# Patient Record
Sex: MALE | Race: WHITE | URBAN - METROPOLITAN AREA
[De-identification: names, ages, dates, MRNs, and addresses within clinical notes are randomized per-mention and may not be internally consistent; named-entity substitution may affect disease eponyms.]

---

## 2018-06-20 ENCOUNTER — OFFICE VISIT (OUTPATIENT)
Dept: PEDIATRICS | Facility: CLINIC | Age: 28
End: 2018-06-20
Payer: COMMERCIAL

## 2018-06-20 VITALS
SYSTOLIC BLOOD PRESSURE: 142 MMHG | TEMPERATURE: 99.8 F | BODY MASS INDEX: 26.59 KG/M2 | WEIGHT: 189.9 LBS | HEIGHT: 71 IN | OXYGEN SATURATION: 96 % | DIASTOLIC BLOOD PRESSURE: 86 MMHG | HEART RATE: 141 BPM

## 2018-06-20 DIAGNOSIS — H65.01 RIGHT ACUTE SEROUS OTITIS MEDIA, RECURRENCE NOT SPECIFIED: Primary | ICD-10-CM

## 2018-06-20 PROCEDURE — 99203 OFFICE O/P NEW LOW 30 MIN: CPT | Performed by: PHYSICIAN ASSISTANT

## 2018-06-20 NOTE — MR AVS SNAPSHOT
"              After Visit Summary   2018    Cody Muñiz    MRN: [id number]           Patient Information     Date Of Birth          1990        Visit Information        Provider Department      2018 12:50 PM Jazmín Hamilton PA-C [facility] [place]        Today's Diagnoses     Right acute serous otitis media, recurrence not specified    -  1      Care Instructions    Begin antibiotics  Take with food            Follow-ups after your visit        Who to contact     If you have questions or need follow up information about today's clinic visit or your schedule please contact [facility] directly at [phone].  Normal or non-critical lab and imaging results will be communicated to you by "[employer]hart, letter or phone within 4 business days after the clinic has received the results. If you do not hear from us within 7 days, please contact the clinic through "[employer]hart or phone. If you have a critical or abnormal lab result, we will notify you by phone as soon as possible.  Submit refill requests through [employer] or call your pharmacy and they will forward the refill request to us. Please allow 3 business days for your refill to be completed.          Additional Information About Your Visit        MyChart Information     [employer] lets you send messages to your doctor, view your test results, renew your prescriptions, schedule appointments and more. To sign up, go to [email or web address] . Click on \"Log in\" on the left side of the screen, which will take you to the Welcome page. Then click on \"Sign up Now\" on the right side of the page.     You will be asked to enter the access code listed below, as well as some personal information. Please follow the directions to create your username and password.     Your access code is: [email or web address]  Expires: 2018  1:10 PM     Your access code will  in 90 days. If you need help or a new code, please call your [facility] or " "[phone].        Care EveryWhere ID     This is your Care EveryWhere ID. This could be used by other organizations to access your [facility] medical records  [id number]        Your Vitals Were     Pulse Temperature Height Pulse Oximetry BMI (Body Mass Index)       141 99.8  F (37.7  C) (Oral) 5' 11\" (1.803 m) 96% 26.49 kg/m2        Blood Pressure from Last 3 Encounters:   06/20/18 142/86   07/31/15 124/86   07/28/15 132/86    Weight from Last 3 Encounters:   06/20/18 189 lb 14.4 oz (86.1 kg)   07/31/15 198 lb 14.4 oz (90.2 kg)   02/21/14 203 lb 12.8 oz (92.4 kg)              Today, you had the following     No orders found for display         Today's Medication Changes          These changes are accurate as of 6/20/18  1:10 PM.  If you have any questions, ask your nurse or doctor.               Start taking these medicines.        Dose/Directions    amoxicillin-clavulanate 875-125 MG per tablet   Commonly known as:  AUGMENTIN   Used for:  Right acute serous otitis media, recurrence not specified   Started by:  Jazmín Hamilton PA-C        Dose:  1 tablet   Take 1 tablet by mouth 2 times daily   Quantity:  20 tablet   Refills:  0            Where to get your medicines      These medications were sent to [id number] - [place], [place] - [address]  AT [address] [place] [address]     Phone:  [phone]     amoxicillin-clavulanate 875-125 MG per tablet                Primary Care Provider Fax #    Physician No Ref-Primary [phone]       No address on file        Equal Access to Services     GABBY NUNEZ AH: Christal Payne, hayley mason, qaybta kaalmada toan, megan nuñez. So [facility] [phone].    ATENCIÓN: Si habla español, tiene a rivera disposición servicios gratuitos de asistencia lingüística. Llame al [phone].    We comply with applicable federal civil rights laws and [place] laws. We " do not discriminate on the basis of race, color, national origin, age, disability, sex, sexual orientation, or gender identity.            Thank you!     Thank you for choosing [facility] [place]  for your care. Our goal is always to provide you with excellent care. Hearing back from our patients is one way we can continue to improve our services. Please take a few minutes to complete the written survey that you may receive in the mail after your visit with us. Thank you!             Your Updated Medication List - Protect others around you: Learn how to safely use, store and throw away your medicines at www.disposemymeds.org.          This list is accurate as of 6/20/18  1:10 PM.  Always use your most recent med list.                   Brand Name Dispense Instructions for use Diagnosis    amoxicillin-clavulanate 875-125 MG per tablet    AUGMENTIN    20 tablet    Take 1 tablet by mouth 2 times daily    Right acute serous otitis media, recurrence not specified

## 2018-06-20 NOTE — PROGRESS NOTES
"  SUBJECTIVE:   Cody Muñiz is a 27 year old male who presents to clinic today for the following health issues:    Acute Illness   Acute illness concerns: cough  Onset: 1 week    Fever: no    Chills/Sweats: YES- sweats    Headache (location?): YES    Sinus Pressure:no    Conjunctivitis:  no    Ear Pain: no    Rhinorrhea: YES- clear    Congestion: YES- chest    Sore Throat: YES- slight     Cough: YES-barking    Wheeze: no    Decreased Appetite: YES    Nausea: no    Vomiting: no    Diarrhea:  no    Dysuria/Freq.: no    Fatigue/Achiness: YES- fatigue    Sick/Strep Exposure: YES- wprk     Therapies Tried and outcome: none  Nonsmoker.   Work:factory      ROS:  ROS otherwise negative    OBJECTIVE:                                                    /86 (BP Location: Right arm, Cuff Size: Adult Large)  Pulse 141  Temp 99.8  F (37.7  C) (Oral)  Ht 5' 11\" (1.803 m)  Wt 189 lb 14.4 oz (86.1 kg)  SpO2 96%  BMI 26.49 kg/m2  Body mass index is 26.49 kg/(m^2).   GENERAL: alert, no distress  HENT: ear canals- normal; TMs- erythemic on right; Nose- normal; Mouth- erythemic posterior pharynx and uvula  NECK: tonsillar LAD  RESP: lungs clear to auscultation - no rales, no rhonchi, no wheezes  CV: regular rates and rhythm, normal S1 S2, no S3 or S4 and no murmur, no click or rub    Diagnostic test results:  No results found for this or any previous visit (from the past 24 hour(s)).     ASSESSMENT/PLAN:                                                    (H65.01) Right acute serous otitis media, recurrence not specified  (primary encounter diagnosis)  Comment: begin antibiotics.   Plan: amoxicillin-clavulanate (AUGMENTIN) 875-125 MG         per tablet          See Patient Instructions    Jazmín Hamilton PA-C  [facility] [place]  "

## 2018-06-20 NOTE — LETTER
June 20, 2018      Cody Muñiz  [address]   [place] [address]        To Whom It May Concern:    Cody Muñiz  was seen on 6/20/18.  Please excuse him from 6/19-6/20 due to illness.        Sincerely,        Jazmín Hamilton PA-C

## 2018-06-26 ENCOUNTER — OFFICE VISIT (OUTPATIENT)
Dept: PEDIATRICS | Facility: CLINIC | Age: 28
End: 2018-06-26
Payer: COMMERCIAL

## 2018-06-26 VITALS
HEIGHT: 71 IN | SYSTOLIC BLOOD PRESSURE: 134 MMHG | TEMPERATURE: 98.4 F | HEART RATE: 119 BPM | OXYGEN SATURATION: 95 % | BODY MASS INDEX: 26.32 KG/M2 | DIASTOLIC BLOOD PRESSURE: 106 MMHG | WEIGHT: 188 LBS

## 2018-06-26 DIAGNOSIS — M54.50 ACUTE RIGHT-SIDED LOW BACK PAIN WITHOUT SCIATICA: Primary | ICD-10-CM

## 2018-06-26 PROCEDURE — 99214 OFFICE O/P EST MOD 30 MIN: CPT | Performed by: INTERNAL MEDICINE

## 2018-06-26 NOTE — MR AVS SNAPSHOT
After Visit Summary   6/26/2018    Cody Muñiz    MRN: [id number]           Patient Information     Date Of Birth          1990        Visit Information        Provider Department      6/26/2018 2:20 PM Alexis Garrison MD [facility]        Today's Diagnoses     Acute right-sided low back pain without sciatica    -  1      Care Instructions    Call physical therapy for an appointment.               Low Back Pain            What is low back pain?   Low back pain is pain and stiffness in the lower back. It is one of the most common reasons people miss work.   How does it occur?   Your lower back is called your lumbar spine. It is made up of 5 bones called lumbar vertebrae. In between the vertebrae are shock absorbers called disks. Back pain can occur from an injury to the vertebrae or when a disk bulges or herniates.   Low back pain is usually caused when a ligament or muscle holding a vertebra in its proper position is strained. Vertebrae are bones that make up the spinal column through which the spinal cord passes. When these muscles or ligaments become weak or strained, the spine loses its stability, resulting in pain.   Low back pain can occur if your job involves lifting and carrying heavy objects, or if you spend a lot of time sitting or standing in one position or bending over. It can be caused by a fall or by unusually strenuous exercise. It can be brought on by the tension and stress that cause headaches in some people. It can even be brought on by violent sneezing or coughing.   People who are overweight may have low back pain because of the added stress on their back.   Back pain may occur when the muscles, joints, bones, and connective tissues of the back become inflamed as a result of an infection or an immune system problem. Arthritic disorders as well as some congenital and degenerative conditions may cause back pain.   Back pain accompanied by loss of bladder or bowel  control, trouble moving your legs, or numbness or tingling in your arms or legs requires immediate medical treatment.   What are the symptoms?   Symptoms include:   pain in the back or legs   stiffness, spasm, or limited motion   The pain may be constant or may happen only in certain positions. It may get worse when you cough, sneeze, bend, twist, or strain during a bowel movement. The pain may be in only one spot or may spread to other areas, most commonly down the buttocks and into the back of the thigh.   A low back strain typically does not produce pain past the knee into the calf or foot. Tingling or numbness in the calf or foot may indicate a herniated disk or pinched nerve.   Be sure to see your healthcare provider if:   You have weakness in your leg, especially if you cannot lift your foot, because this may be a sign of nerve damage.   You have new bowel or bladder problems as well as back pain, which may be a sign of severe injury to your spinal cord.   You have pain that gets worse despite treatment.   How is it diagnosed?   Your healthcare provider will review your medical history and examine you. You may have X-rays, an MRI, CT scan, or a bone scan.   How is it treated?   To treat this condition:   Put an ice pack, gel pack, or package of frozen vegetables, wrapped in a cloth on the area every 3 to 4 hours, for up to 20 minutes at a time for the first 2 or 3 days.   Use a heating pad or hot water bottle. Don't let the heating pad get too hot, and don't fall asleep with it. You could get a burn.   Rest in bed on a firm mattress. Often it helps to lie on your back with your knees raised on a pillow. However, some people prefer to lie on their side with their knees bent. It's best to try to stay active, so try not to rest in bed longer than 1 to 2 days.   Take muscle relaxants as recommended by your healthcare provider.   Take an anti-inflammatory such as ibuprofen, or other medicine as directed by your  provider. Nonsteroidal anti-inflammatory medicines (NSAIDs) may cause stomach bleeding and other problems. These risks increase with age. Read the label and take as directed. Unless recommended by your healthcare provider, do not take for more than 10 days.   Get a back massage by a trained person.   Wear a belt or corset to support your back.   Do the exercises recommended by your provider. Your provider may also prescribe physical therapy.   Talk with a counselor, if your back pain is related to tension caused by emotional problems.   When the pain is gone, ask your healthcare provider about starting an exercise program such as the following:   Exercise moderately every day, using stretching and warm-up exercises suggested by your provider or physical therapist.   Exercise vigorously for about 30 minutes 3 times a week by walking, swimming, using a stationary bicycle, or doing low-impact aerobics.   Exercising regularly will not only help your back, it will also help keep you healthier overall.   How long will the effects last?   The effects of back pain last as long as the cause exists or until your body recovers from the strain, usually a day or two but sometimes weeks.   How can I take care of myself?   In addition to the treatment described above, keep in mind these suggestions:   Practice good posture. Stand with your head up, shoulders straight, chest forward, weight balanced evenly on both feet, and pelvis tucked in.   Lose weight if you are overweight   Keep your core muscles strong. These are your abdominal and back muscles.   Sleep without a pillow under your head.   Pain is the best way to  the pace you should set in increasing your activity and exercise. Minor discomfort, stiffness, soreness, and mild aches need not interfere with activity. However, limit your activities temporarily if:   Your symptoms return.   The pain increases when you are more active.   The pain increases within 24 hours  after a new or higher level of activity.   When can I return to my normal activities?   Everyone recovers from an injury at a different rate. Return to your activities depends on how soon your back recovers, not by how many days or weeks it has been since your injury has occurred. In general, the longer you have symptoms before you start treatment, the longer it will take to get better. The goal is to return to your normal activities as soon as is safely possible. If you return too soon you may worsen your injury.   It is important that you have fully recovered from your low back pain before you return to any strenuous activity. You must be able to have the same range of motion that you had before your injury. You must be able to walk and twist without pain.   What can I do to help prevent low back pain?   You can reduce the strain on your back by doing the following:   Don't push with your arms when you move a heavy object. Turn around and push backwards so the strain is taken by your legs.   Whenever you sit, sit in a straight-backed chair and hold your spine against the back of the chair.   Bend your knees and hips and keep your back straight when you lift a heavy object.   Avoid lifting heavy objects higher than your waist.   Hold packages you carry close to your body, with your arms bent.   Use a footrest for one foot when you stand or sit in one spot for a long time. This keeps your back straight.   Bend your knees when you bend over.   Sit close to the pedals when you drive and use your seat belt and a hard backrest or pillow.   Lie on your side with your knees bent when you sleep or rest. It may help to put a pillow between your knees.   Put a pillow under your knees when you sleep on your back.   Raise the foot of the bed 8 inches to discourage sleeping on your stomach unless you have other problems that require that you keep your head elevated.   To rest your back, hold each of these positions for  5?minutes or longer:   Lie on your back, bend your knees, and put pillows under your knees.   Lie on your back on the floor with a pillow under your neck. Bend your knees to a 90-degree angle, and put your lower legs and feet on a chair.   Lie on your back, bend your knees, and bring one knee up to your chest and hold it there. Repeat with the other knee, then bring both knees to your chest. When holding your knee to your chest, grab your thigh rather than your lower leg to avoid over flexing your knee.     Published by [employer].  This content is reviewed periodically and is subject to change as new health information becomes available. The information is intended to inform and educate and is not a replacement for medical evaluation, advice, diagnosis or treatment by a healthcare professional.   Developed by Viry Up RN, [place], and [employer].   ? 2010 [facility] and/or its affiliates. All Rights Reserved.           Low Back Pain Exercise          Standing hamstring stretch: Put the heel of one leg on a stool about 15 inches high. Keep your leg straight. Lean forward, bending at the hips until you feel a mild stretch in the back of your thigh. Make sure you do not roll your shoulders or bend at the waist when doing this. You want to stretch your leg, not your lower back. Hold the stretch for 15 to 30 seconds. Repeat with each leg 3 times.   Cat and camel: Get down on your hands and knees. Let your stomach sag, allowing your back to curve downward. Hold this position for 5 seconds. Then arch your back and hold for 5 seconds. Do 3 sets of 10.   Quadruped arm and leg raise: Get down on your hands and knees. Pull in your belly button and tighten your abdominal muscles to stiffen your spine. While keeping your abdominals tight, raise one arm and the opposite leg away from you. Hold this position for 5 seconds. Lower your arm and leg slowly and change sides. Do this 10 times on each side.   Pelvic tilt: Lie on  your back with your knees bent and your feet flat on the floor. Tighten your abdominal muscles and push your lower back into the floor. Hold this position for 5 seconds, then relax. Do 3 sets of 10.   Partial curl: Lie on your back with your knees bent and your feet flat on the floor. Tighten your stomach muscles. Tuck your chin to your chest. With your hands stretched out in front of you, curl your upper body forward until your shoulders clear the floor. Hold this position for 3 seconds. Don't hold your breath. It helps to breathe out as you lift your shoulders up. Relax back to the floor. Repeat 10 times. Build to 3 sets of 10. To challenge yourself, clasp your hands behind your head and keep your elbows out to the side.   Gluteal stretch: Lie on your back with both knees bent. Rest the ankle of one leg over the knee of your other leg. Grasp the thigh of the bottom leg and pull toward your chest. You will feel a stretch along the buttocks and possibly along the outside of your hip. Hold the stretch for 15 to 30 seconds. Repeat 3 times with each leg.   Extension exercise:   0. Lie face down on the floor for 5 minutes. If this hurts too much, lie face down with a pillow under your stomach. This should relieve your leg or back pain. When you can lie on your stomach for 5 minutes without a pillow, you can continue with Part B of this exercise.   0. After lying on your stomach for 5 minutes, prop yourself up on your elbows for another 5 minutes. If you can do this without having more leg or buttock pain, you can start doing part C of this exercise.   0. Lie on your stomach with your hands under your shoulders. Then press down on your hands and extend your elbows while keeping your hips flat on the floor. Hold for 1 second and lower yourself to the floor. Do 3 to 5 sets of 10 repetitions. Rest for 1 minute between sets. You should have no pain in your legs when you do this, but it is normal to feel some pain in your  lower back.   Do this exercise several times a day.   Side plank: Lie on your side with your legs, hips, and shoulders in a straight line. Prop yourself up onto your forearm so your elbow is directly under your shoulder. Lift your hips off the floor and balance on your forearm and the outside of your foot. Try to hold this position for 15 seconds, then slowly lower your hip to the ground. Switch sides and repeat. Work up to holding for 1 minute or longer. This exercise can be made easier by starting with your knees and hips flexed toward your chest.   Published by [employer].  This content is reviewed periodically and is subject to change as new health information becomes available. The information is intended to inform and educate and is not a replacement for medical evaluation, advice, diagnosis or treatment by a healthcare professional.   Written by Eva Ibarra MS, PT, and Viry Kaye PT, [facility], [facility], for [facility]   ? 2010 [facility] and/or its affiliates. All Rights Reserved.         Copyright   [employer] 2011                      Follow-ups after your visit        Additional Services     [facility] PT, HAND, AND CHIROPRACTIC REFERRAL       **This order will print in the [facility] [place] [facility] Scheduling Office**    Physical Therapy, Hand Therapy and Chiropractic Care are available through:    *[facility]  *[facility]  *[facility]    Call one number to schedule at any of the above locations: [phone].    Your provider has referred you to: [facility] - PT and/or Chiropractic Care determined by clinical presentation at [facility] or [facility] Initial Visit    Indication/Reason for Referral: Low Back Pain  Onset of Illness:   Therapy Orders: Evaluate and Treat  Special Programs: None  Special Request: Jaycob Webb      Additional Comments for the Therapist or Chiropractor:     Please be aware that coverage of these services is subject to the  "terms and limitations of your health insurance plan.  Call member services at your health plan with any benefit or coverage questions.      Please bring the following to your appointment:    *Your personal calendar for scheduling future appointments  *Comfortable clothing                  Who to contact     If you have questions or need follow up information about today's clinic visit or your schedule please contact [facility] directly at [phone].  Normal or non-critical lab and imaging results will be communicated to you by MyChart, letter or phone within 4 business days after the clinic has received the results. If you do not hear from us within 7 days, please contact the clinic through MyChart or phone. If you have a critical or abnormal lab result, we will notify you by phone as soon as possible.  Submit refill requests through [employer] or call your pharmacy and they will forward the refill request to us. Please allow 3 business days for your refill to be completed.          Additional Information About Your Visit        [employer] Information     [employer] lets you send messages to your doctor, view your test results, renew your prescriptions, schedule appointments and more. To sign up, go to [email or web address] . Click on \"Log in\" on the left side of the screen, which will take you to the Welcome page. Then click on \"Sign up Now\" on the right side of the page.     You will be asked to enter the access code listed below, as well as some personal information. Please follow the directions to create your username and password.     Your access code is: [email or web address]  Expires: 2018  1:10 PM     Your access code will  in 90 days. If you need help or a new code, please call your [place] clinic or [phone].        Care EveryWhere ID     This is your Care EveryWhere ID. This could be used by other organizations to access your [place] medical records  [id number]        Your Vitals Were  " "   Pulse Temperature Height Pulse Oximetry BMI (Body Mass Index)       119 98.4  F (36.9  C) (Oral) 5' 11\" (1.803 m) 95% 26.22 kg/m2        Blood Pressure from Last 3 Encounters:   06/26/18 (!) 134/106   06/20/18 142/86   07/31/15 124/86    Weight from Last 3 Encounters:   06/26/18 188 lb (85.3 kg)   06/20/18 189 lb 14.4 oz (86.1 kg)   07/31/15 198 lb 14.4 oz (90.2 kg)              We Performed the Following     [facility] PT, HAND, AND CHIROPRACTIC REFERRAL        Primary Care Provider Fax #    Physician No Ref-Primary [phone]       No address on file        Equal Access to Services     GABBY NUNEZ : Christal Payne, waaxda luqadaha, qaybta kaalmada toan, megan raman . So [facility] [phone].    ATENCIÓN: Si habla español, tiene a rivera disposición servicios gratuitos de asistencia lingüística. Llame al [phone].    We comply with applicable federal civil rights laws and [place] laws. We do not discriminate on the basis of race, color, national origin, age, disability, sex, sexual orientation, or gender identity.            Thank you!     Thank you for choosing [facility]  for your care. Our goal is always to provide you with excellent care. Hearing back from our patients is one way we can continue to improve our services. Please take a few minutes to complete the written survey that you may receive in the mail after your visit with us. Thank you!             Your Updated Medication List - Protect others around you: Learn how to safely use, store and throw away your medicines at www.disposemymeds.org.          This list is accurate as of 6/26/18  2:41 PM.  Always use your most recent med list.                   Brand Name Dispense Instructions for use Diagnosis    amoxicillin-clavulanate 875-125 MG per tablet    AUGMENTIN     TAKE 1 TABLET BY MOUTH TWICE A DAY          "

## 2018-06-26 NOTE — PATIENT INSTRUCTIONS
Call physical therapy for an appointment.               Low Back Pain            What is low back pain?   Low back pain is pain and stiffness in the lower back. It is one of the most common reasons people miss work.   How does it occur?   Your lower back is called your lumbar spine. It is made up of 5 bones called lumbar vertebrae. In between the vertebrae are shock absorbers called disks. Back pain can occur from an injury to the vertebrae or when a disk bulges or herniates.   Low back pain is usually caused when a ligament or muscle holding a vertebra in its proper position is strained. Vertebrae are bones that make up the spinal column through which the spinal cord passes. When these muscles or ligaments become weak or strained, the spine loses its stability, resulting in pain.   Low back pain can occur if your job involves lifting and carrying heavy objects, or if you spend a lot of time sitting or standing in one position or bending over. It can be caused by a fall or by unusually strenuous exercise. It can be brought on by the tension and stress that cause headaches in some people. It can even be brought on by violent sneezing or coughing.   People who are overweight may have low back pain because of the added stress on their back.   Back pain may occur when the muscles, joints, bones, and connective tissues of the back become inflamed as a result of an infection or an immune system problem. Arthritic disorders as well as some congenital and degenerative conditions may cause back pain.   Back pain accompanied by loss of bladder or bowel control, trouble moving your legs, or numbness or tingling in your arms or legs requires immediate medical treatment.   What are the symptoms?   Symptoms include:   pain in the back or legs   stiffness, spasm, or limited motion   The pain may be constant or may happen only in certain positions. It may get worse when you cough, sneeze, bend, twist, or strain during a bowel  movement. The pain may be in only one spot or may spread to other areas, most commonly down the buttocks and into the back of the thigh.   A low back strain typically does not produce pain past the knee into the calf or foot. Tingling or numbness in the calf or foot may indicate a herniated disk or pinched nerve.   Be sure to see your healthcare provider if:   You have weakness in your leg, especially if you cannot lift your foot, because this may be a sign of nerve damage.   You have new bowel or bladder problems as well as back pain, which may be a sign of severe injury to your spinal cord.   You have pain that gets worse despite treatment.   How is it diagnosed?   Your healthcare provider will review your medical history and examine you. You may have X-rays, an MRI, CT scan, or a bone scan.   How is it treated?   To treat this condition:   Put an ice pack, gel pack, or package of frozen vegetables, wrapped in a cloth on the area every 3 to 4 hours, for up to 20 minutes at a time for the first 2 or 3 days.   Use a heating pad or hot water bottle. Don't let the heating pad get too hot, and don't fall asleep with it. You could get a burn.   Rest in bed on a firm mattress. Often it helps to lie on your back with your knees raised on a pillow. However, some people prefer to lie on their side with their knees bent. It's best to try to stay active, so try not to rest in bed longer than 1 to 2 days.   Take muscle relaxants as recommended by your healthcare provider.   Take an anti-inflammatory such as ibuprofen, or other medicine as directed by your provider. Nonsteroidal anti-inflammatory medicines (NSAIDs) may cause stomach bleeding and other problems. These risks increase with age. Read the label and take as directed. Unless recommended by your healthcare provider, do not take for more than 10 days.   Get a back massage by a trained person.   Wear a belt or corset to support your back.   Do the exercises recommended  by your provider. Your provider may also prescribe physical therapy.   Talk with a counselor, if your back pain is related to tension caused by emotional problems.   When the pain is gone, ask your healthcare provider about starting an exercise program such as the following:   Exercise moderately every day, using stretching and warm-up exercises suggested by your provider or physical therapist.   Exercise vigorously for about 30 minutes 3 times a week by walking, swimming, using a stationary bicycle, or doing low-impact aerobics.   Exercising regularly will not only help your back, it will also help keep you healthier overall.   How long will the effects last?   The effects of back pain last as long as the cause exists or until your body recovers from the strain, usually a day or two but sometimes weeks.   How can I take care of myself?   In addition to the treatment described above, keep in mind these suggestions:   Practice good posture. Stand with your head up, shoulders straight, chest forward, weight balanced evenly on both feet, and pelvis tucked in.   Lose weight if you are overweight   Keep your core muscles strong. These are your abdominal and back muscles.   Sleep without a pillow under your head.   Pain is the best way to  the pace you should set in increasing your activity and exercise. Minor discomfort, stiffness, soreness, and mild aches need not interfere with activity. However, limit your activities temporarily if:   Your symptoms return.   The pain increases when you are more active.   The pain increases within 24 hours after a new or higher level of activity.   When can I return to my normal activities?   Everyone recovers from an injury at a different rate. Return to your activities depends on how soon your back recovers, not by how many days or weeks it has been since your injury has occurred. In general, the longer you have symptoms before you start treatment, the longer it will take to get  better. The goal is to return to your normal activities as soon as is safely possible. If you return too soon you may worsen your injury.   It is important that you have fully recovered from your low back pain before you return to any strenuous activity. You must be able to have the same range of motion that you had before your injury. You must be able to walk and twist without pain.   What can I do to help prevent low back pain?   You can reduce the strain on your back by doing the following:   Don't push with your arms when you move a heavy object. Turn around and push backwards so the strain is taken by your legs.   Whenever you sit, sit in a straight-backed chair and hold your spine against the back of the chair.   Bend your knees and hips and keep your back straight when you lift a heavy object.   Avoid lifting heavy objects higher than your waist.   Hold packages you carry close to your body, with your arms bent.   Use a footrest for one foot when you stand or sit in one spot for a long time. This keeps your back straight.   Bend your knees when you bend over.   Sit close to the pedals when you drive and use your seat belt and a hard backrest or pillow.   Lie on your side with your knees bent when you sleep or rest. It may help to put a pillow between your knees.   Put a pillow under your knees when you sleep on your back.   Raise the foot of the bed 8 inches to discourage sleeping on your stomach unless you have other problems that require that you keep your head elevated.   To rest your back, hold each of these positions for 5?minutes or longer:   Lie on your back, bend your knees, and put pillows under your knees.   Lie on your back on the floor with a pillow under your neck. Bend your knees to a 90-degree angle, and put your lower legs and feet on a chair.   Lie on your back, bend your knees, and bring one knee up to your chest and hold it there. Repeat with the other knee, then bring both knees to your  chest. When holding your knee to your chest, grab your thigh rather than your lower leg to avoid over flexing your knee.     Published by [facility].  This content is reviewed periodically and is subject to change as new health information becomes available. The information is intended to inform and educate and is not a replacement for medical evaluation, advice, diagnosis or treatment by a healthcare professional.   Developed by Viry Up RN, [place], and [facility].   ? 2010 [facility] and/or its affiliates. All Rights Reserved.           Low Back Pain Exercise          Standing hamstring stretch: Put the heel of one leg on a stool about 15 inches high. Keep your leg straight. Lean forward, bending at the hips until you feel a mild stretch in the back of your thigh. Make sure you do not roll your shoulders or bend at the waist when doing this. You want to stretch your leg, not your lower back. Hold the stretch for 15 to 30 seconds. Repeat with each leg 3 times.   Cat and camel: Get down on your hands and knees. Let your stomach sag, allowing your back to curve downward. Hold this position for 5 seconds. Then arch your back and hold for 5 seconds. Do 3 sets of 10.   Quadruped arm and leg raise: Get down on your hands and knees. Pull in your belly button and tighten your abdominal muscles to stiffen your spine. While keeping your abdominals tight, raise one arm and the opposite leg away from you. Hold this position for 5 seconds. Lower your arm and leg slowly and change sides. Do this 10 times on each side.   Pelvic tilt: Lie on your back with your knees bent and your feet flat on the floor. Tighten your abdominal muscles and push your lower back into the floor. Hold this position for 5 seconds, then relax. Do 3 sets of 10.   Partial curl: Lie on your back with your knees bent and your feet flat on the floor. Tighten your stomach muscles. Tuck your chin to your chest. With your hands stretched out in front of  you, curl your upper body forward until your shoulders clear the floor. Hold this position for 3 seconds. Don't hold your breath. It helps to breathe out as you lift your shoulders up. Relax back to the floor. Repeat 10 times. Build to 3 sets of 10. To challenge yourself, clasp your hands behind your head and keep your elbows out to the side.   Gluteal stretch: Lie on your back with both knees bent. Rest the ankle of one leg over the knee of your other leg. Grasp the thigh of the bottom leg and pull toward your chest. You will feel a stretch along the buttocks and possibly along the outside of your hip. Hold the stretch for 15 to 30 seconds. Repeat 3 times with each leg.   Extension exercise:   0. Lie face down on the floor for 5 minutes. If this hurts too much, lie face down with a pillow under your stomach. This should relieve your leg or back pain. When you can lie on your stomach for 5 minutes without a pillow, you can continue with Part B of this exercise.   0. After lying on your stomach for 5 minutes, prop yourself up on your elbows for another 5 minutes. If you can do this without having more leg or buttock pain, you can start doing part C of this exercise.   0. Lie on your stomach with your hands under your shoulders. Then press down on your hands and extend your elbows while keeping your hips flat on the floor. Hold for 1 second and lower yourself to the floor. Do 3 to 5 sets of 10 repetitions. Rest for 1 minute between sets. You should have no pain in your legs when you do this, but it is normal to feel some pain in your lower back.   Do this exercise several times a day.   Side plank: Lie on your side with your legs, hips, and shoulders in a straight line. Prop yourself up onto your forearm so your elbow is directly under your shoulder. Lift your hips off the floor and balance on your forearm and the outside of your foot. Try to hold this position for 15 seconds, then slowly lower your hip to the ground.  Switch sides and repeat. Work up to holding for 1 minute or longer. This exercise can be made easier by starting with your knees and hips flexed toward your chest.   Published by [employer].  This content is reviewed periodically and is subject to change as new health information becomes available. The information is intended to inform and educate and is not a replacement for medical evaluation, advice, diagnosis or treatment by a healthcare professional.   Written by Eva Ibarra, MS, PT, and Viry Kaye PT, [facility], [facility], for [facility]   ? 2010 [facility] and/or its affiliates. All Rights Reserved.         Copyright   [employer] 2011

## 2018-06-26 NOTE — PROGRESS NOTES
"  SUBJECTIVE:   Cody Muñiz is a 27 year old male who presents to clinic today for the following health issues:      Musculoskeletal problem/pain      Duration: one day    Description  Location: lower back pain, central to right side    Intensity:  Mild, improving    Accompanying signs and symptoms: none    History  Previous similar problem: YES, sciatica for years  Previous evaluation:  none    Precipitating or alleviating factors:  Trauma or overuse: YES, started after falling asleep on couch  Aggravating factors include: standing    Therapies tried and outcome: ice and heating pad      Began about 1 lizeth ago with low back pain.  Has had recurring low back pain for many years, usually in lower back. In past has had sciatica.  Can get \"full on numbness of right leg\", all the way to mild low back pain.  Last time had true sciatica was about 8-10 months ago.      No recent injury.  But slept weird and pain recurred this AM.     Currently pain is not present, but was significant when scheduled appointment.  Perhaps some mild pain.  No b/b symptoms.      Has tried no over-the-counter medications.     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  Lifts more than he should.     Problem list and histories reviewed & adjusted, as indicated.  Additional history: as documented    Patient Active Problem List   Diagnosis     Generalized anxiety disorder     ADHD (attention deficit hyperactivity disorder)     History reviewed. No pertinent surgical history.    Social History   Substance Use Topics     Smoking status: Former Smoker     Quit date: 11/21/2009     Smokeless tobacco: Never Used     Alcohol use Yes      Comment: drinks a couple times a week 3 drinks     Family History   Problem Relation Age of Onset     Cancer Mother      lung     Cancer - colorectal Maternal Grandfather      Prostate Cancer Paternal Grandfather          Current Outpatient Prescriptions   Medication Sig Dispense Refill     amoxicillin-clavulanate (AUGMENTIN) " "875-125 MG per tablet TAKE 1 TABLET BY MOUTH TWICE A DAY  0     No Known Allergies  BP Readings from Last 3 Encounters:   06/26/18 (!) 134/106   06/20/18 142/86   07/31/15 124/86    Wt Readings from Last 3 Encounters:   06/26/18 188 lb (85.3 kg)   06/20/18 189 lb 14.4 oz (86.1 kg)   07/31/15 198 lb 14.4 oz (90.2 kg)                  Labs reviewed in EPIC    Reviewed and updated as needed this visit by clinical staff       Reviewed and updated as needed this visit by Provider         ROS:  CONSTITUTIONAL: NEGATIVE for fever, chills, change in weight  ENT/MOUTH: NEGATIVE for ear, mouth and throat problems  RESP: NEGATIVE for significant cough or SOB  CV: NEGATIVE for chest pain, palpitations or peripheral edema    OBJECTIVE:                                                    BP (!) 134/106 (BP Location: Right arm, Cuff Size: Adult Large)  Pulse 119  Temp 98.4  F (36.9  C) (Oral)  Ht 5' 11\" (1.803 m)  Wt 188 lb (85.3 kg)  SpO2 95%  BMI 26.22 kg/m2  Body mass index is 26.22 kg/(m^2).   GENERAL: healthy, alert, well nourished, well hydrated, no distress  HENT: ear canals- normal; TMs- normal; Nose- normal; Mouth- no ulcers, no lesions  NECK: no tenderness, no adenopathy, no asymmetry, no masses, no stiffness; thyroid- normal to palpation  RESP: lungs clear to auscultation - no rales, no rhonchi, no wheezes  CV: regular rates and rhythm, normal S1 S2, no S3 or S4 and no murmur, no click or rub -  ABDOMEN: soft, no tenderness, no  hepatosplenomegaly, no masses, normal bowel sounds  Lower Back:  Mild pain lower lumbar region, extending beneath iliac crest on right only.  No pain to palpation of pyriformis or deep gluteal muscles. No masses palpable.  Negative straight leg testing bilaterally, and reflexes are normal bilaterally.  No decreased sensation in lower extremities.  Intact light touch.     Diagnostic test results:  Diagnostic Test Results:  none      ASSESSMENT/PLAN:                                            "         1. Acute right-sided low back pain without sciatica  Pain is currently gone.  No meds for now.  However, patient would like to have evaluation and guidelines given by physical therapy.  Referred.  Discussed limiting lifting at work.  No danger signs requiring MRi or other imaging.     - [facility]    2.  Hypertension;  Advised to return to clinic for complete physcial exam within 6 months; blood pressure up today likely due to anxiety.  Advised to quit smokint.     See Patient Instructions    Alexis Garrison MD  [facility]

## 2018-06-26 NOTE — LETTER
June 26, 2018      Cody Muñiz  [address]   [place] [address]        To Whom It May Concern:    Cody Muñiz was seen in our clinic. He was off work yesterday and today.      He may return to work tomorrow without restrictions.      Sincerely,        Alexis Garrison MD

## 2018-06-27 ENCOUNTER — OFFICE VISIT (OUTPATIENT)
Dept: PEDIATRICS | Facility: CLINIC | Age: 28
End: 2018-06-27
Payer: COMMERCIAL

## 2018-06-27 VITALS
TEMPERATURE: 99.1 F | HEART RATE: 124 BPM | SYSTOLIC BLOOD PRESSURE: 138 MMHG | WEIGHT: 188 LBS | OXYGEN SATURATION: 97 % | BODY MASS INDEX: 26.32 KG/M2 | HEIGHT: 71 IN | DIASTOLIC BLOOD PRESSURE: 84 MMHG

## 2018-06-27 DIAGNOSIS — M54.41 ACUTE MIDLINE LOW BACK PAIN WITH RIGHT-SIDED SCIATICA: Primary | ICD-10-CM

## 2018-06-27 PROCEDURE — 99214 OFFICE O/P EST MOD 30 MIN: CPT | Performed by: PHYSICIAN ASSISTANT

## 2018-06-27 RX ORDER — CYCLOBENZAPRINE HCL 10 MG
10 TABLET ORAL 3 TIMES DAILY PRN
Qty: 15 TABLET | Refills: 0 | Status: SHIPPED | OUTPATIENT
Start: 2018-06-27 | End: 2018-09-20

## 2018-06-27 NOTE — PROGRESS NOTES
"  SUBJECTIVE:   Cody Muñiz is a 27 year old male who presents to clinic today for the following health issues:     Back Pain     Duration: 2 days        Specific cause: slept on couch a few nights ago    Description:   Location of pain: low back bilateral and middle of back bilateral  Character of pain: sharp, throbbing and constant  Pain radiation:none  New numbness or weakness in legs, not attributed to pain:  no     Intensity: Currently 4/10, At its worst 8/10    History:   Pain interferes with job: YES  No bowel or bladder incontinence  History of back problems: sciatic  Any previous MRI or X-rays: None  Sees a specialist for back pain:  No  Therapies tried without relief: massage    Alleviating factors:   Improved by: cold and heat, aleve    Precipitating factors:  Worsened by: Bending    Functional and Psychosocial Screen (Tracey STarT Back):      Not performed today     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   Physical therapy scheduled    ROS:  ROS otherwise negative    OBJECTIVE:                                                    /84 (BP Location: Right arm, Cuff Size: Adult Large)  Pulse 124  Temp 99.1  F (37.3  C) (Oral)  Ht 5' 11\" (1.803 m)  Wt 188 lb (85.3 kg)  SpO2 97%  BMI 26.22 kg/m2  Body mass index is 26.22 kg/(m^2).   GENERAL: alert, no distress  Lumber/Thoracic Spine Exam: Tender:  left para lumbar muscles, right para lumbar muscles  Range of Motion:  lumbar flexion  decreased, lumbar extension  full, left lateral lumbar bending  decreased, right lateral lumbar bending  decreased  Strength: full  Special tests:  positive right straight leg raise    Diagnostic test results:  No results found for this or any previous visit (from the past 24 hour(s)).     ASSESSMENT/PLAN:                                                    (M54.41) Acute midline low back pain with right-sided sciatica  (primary encounter diagnosis)  Comment: begin heat/ice, modification of activities, and " physical therapy. Flexeril PRN. Work restrictions given.   Plan: cyclobenzaprine (FLEXERIL) 10 MG tablet          Jazmín Hamilton PA-C  [facility]

## 2018-06-27 NOTE — LETTER
June 27, 2018      Cody Muñiz  [address]   [place] [address]        To Whom It May Concern:    Cody Muñiz  was seen on 6/20/18.  Please excuse him from 6/18/18-6/20/18 due to illness. He is cleared to return to work on 6/21/18.         Sincerely,        Jazmín Hamilton PA-C

## 2018-06-27 NOTE — PATIENT INSTRUCTIONS
Low Back Pain            What is low back pain?   Low back pain is pain and stiffness in the lower back. It is one of the most common reasons people miss work.   How does it occur?   Your lower back is called your lumbar spine. It is made up of 5 bones called lumbar vertebrae. In between the vertebrae are shock absorbers called disks. Back pain can occur from an injury to the vertebrae or when a disk bulges or herniates.   Low back pain is usually caused when a ligament or muscle holding a vertebra in its proper position is strained. Vertebrae are bones that make up the spinal column through which the spinal cord passes. When these muscles or ligaments become weak or strained, the spine loses its stability, resulting in pain.   Low back pain can occur if your job involves lifting and carrying heavy objects, or if you spend a lot of time sitting or standing in one position or bending over. It can be caused by a fall or by unusually strenuous exercise. It can be brought on by the tension and stress that cause headaches in some people. It can even be brought on by violent sneezing or coughing.   People who are overweight may have low back pain because of the added stress on their back.   Back pain may occur when the muscles, joints, bones, and connective tissues of the back become inflamed as a result of an infection or an immune system problem. Arthritic disorders as well as some congenital and degenerative conditions may cause back pain.   Back pain accompanied by loss of bladder or bowel control, trouble moving your legs, or numbness or tingling in your arms or legs requires immediate medical treatment.   What are the symptoms?   Symptoms include:   pain in the back or legs   stiffness, spasm, or limited motion   The pain may be constant or may happen only in certain positions. It may get worse when you cough, sneeze, bend, twist, or strain during a bowel movement. The pain may be in only one spot or may  spread to other areas, most commonly down the buttocks and into the back of the thigh.   A low back strain typically does not produce pain past the knee into the calf or foot. Tingling or numbness in the calf or foot may indicate a herniated disk or pinched nerve.   Be sure to see your healthcare provider if:   You have weakness in your leg, especially if you cannot lift your foot, because this may be a sign of nerve damage.   You have new bowel or bladder problems as well as back pain, which may be a sign of severe injury to your spinal cord.   You have pain that gets worse despite treatment.   How is it diagnosed?   Your healthcare provider will review your medical history and examine you. You may have X-rays, an MRI, CT scan, or a bone scan.   How is it treated?   To treat this condition:   Put an ice pack, gel pack, or package of frozen vegetables, wrapped in a cloth on the area every 3 to 4 hours, for up to 20 minutes at a time for the first 2 or 3 days.   Use a heating pad or hot water bottle. Don't let the heating pad get too hot, and don't fall asleep with it. You could get a burn.   Rest in bed on a firm mattress. Often it helps to lie on your back with your knees raised on a pillow. However, some people prefer to lie on their side with their knees bent. It's best to try to stay active, so try not to rest in bed longer than 1 to 2 days.   Take muscle relaxants as recommended by your healthcare provider.   Take an anti-inflammatory such as ibuprofen, or other medicine as directed by your provider. Nonsteroidal anti-inflammatory medicines (NSAIDs) may cause stomach bleeding and other problems. These risks increase with age. Read the label and take as directed. Unless recommended by your healthcare provider, do not take for more than 10 days.   Get a back massage by a trained person.   Wear a belt or corset to support your back.   Do the exercises recommended by your provider. Your provider may also prescribe  physical therapy.   Talk with a counselor, if your back pain is related to tension caused by emotional problems.   When the pain is gone, ask your healthcare provider about starting an exercise program such as the following:   Exercise moderately every day, using stretching and warm-up exercises suggested by your provider or physical therapist.   Exercise vigorously for about 30 minutes 3 times a week by walking, swimming, using a stationary bicycle, or doing low-impact aerobics.   Exercising regularly will not only help your back, it will also help keep you healthier overall.   How long will the effects last?   The effects of back pain last as long as the cause exists or until your body recovers from the strain, usually a day or two but sometimes weeks.   How can I take care of myself?   In addition to the treatment described above, keep in mind these suggestions:   Practice good posture. Stand with your head up, shoulders straight, chest forward, weight balanced evenly on both feet, and pelvis tucked in.   Lose weight if you are overweight   Keep your core muscles strong. These are your abdominal and back muscles.   Sleep without a pillow under your head.   Pain is the best way to  the pace you should set in increasing your activity and exercise. Minor discomfort, stiffness, soreness, and mild aches need not interfere with activity. However, limit your activities temporarily if:   Your symptoms return.   The pain increases when you are more active.   The pain increases within 24 hours after a new or higher level of activity.   When can I return to my normal activities?   Everyone recovers from an injury at a different rate. Return to your activities depends on how soon your back recovers, not by how many days or weeks it has been since your injury has occurred. In general, the longer you have symptoms before you start treatment, the longer it will take to get better. The goal is to return to your normal  activities as soon as is safely possible. If you return too soon you may worsen your injury.   It is important that you have fully recovered from your low back pain before you return to any strenuous activity. You must be able to have the same range of motion that you had before your injury. You must be able to walk and twist without pain.   What can I do to help prevent low back pain?   You can reduce the strain on your back by doing the following:   Don't push with your arms when you move a heavy object. Turn around and push backwards so the strain is taken by your legs.   Whenever you sit, sit in a straight-backed chair and hold your spine against the back of the chair.   Bend your knees and hips and keep your back straight when you lift a heavy object.   Avoid lifting heavy objects higher than your waist.   Hold packages you carry close to your body, with your arms bent.   Use a footrest for one foot when you stand or sit in one spot for a long time. This keeps your back straight.   Bend your knees when you bend over.   Sit close to the pedals when you drive and use your seat belt and a hard backrest or pillow.   Lie on your side with your knees bent when you sleep or rest. It may help to put a pillow between your knees.   Put a pillow under your knees when you sleep on your back.   Raise the foot of the bed 8 inches to discourage sleeping on your stomach unless you have other problems that require that you keep your head elevated.   To rest your back, hold each of these positions for 5?minutes or longer:   Lie on your back, bend your knees, and put pillows under your knees.   Lie on your back on the floor with a pillow under your neck. Bend your knees to a 90-degree angle, and put your lower legs and feet on a chair.   Lie on your back, bend your knees, and bring one knee up to your chest and hold it there. Repeat with the other knee, then bring both knees to your chest. When holding your knee to your chest,  grab your thigh rather than your lower leg to avoid over flexing your knee.     Published by [employer].  This content is reviewed periodically and is subject to change as new health information becomes available. The information is intended to inform and educate and is not a replacement for medical evaluation, advice, diagnosis or treatment by a healthcare professional.   Developed by Viry Up RN, [place], and [facility].   ? 2010 [facility] and/or its affiliates. All Rights Reserved.           Low Back Pain Exercise          Standing hamstring stretch: Put the heel of one leg on a stool about 15 inches high. Keep your leg straight. Lean forward, bending at the hips until you feel a mild stretch in the back of your thigh. Make sure you do not roll your shoulders or bend at the waist when doing this. You want to stretch your leg, not your lower back. Hold the stretch for 15 to 30 seconds. Repeat with each leg 3 times.   Cat and camel: Get down on your hands and knees. Let your stomach sag, allowing your back to curve downward. Hold this position for 5 seconds. Then arch your back and hold for 5 seconds. Do 3 sets of 10.   Quadruped arm and leg raise: Get down on your hands and knees. Pull in your belly button and tighten your abdominal muscles to stiffen your spine. While keeping your abdominals tight, raise one arm and the opposite leg away from you. Hold this position for 5 seconds. Lower your arm and leg slowly and change sides. Do this 10 times on each side.   Pelvic tilt: Lie on your back with your knees bent and your feet flat on the floor. Tighten your abdominal muscles and push your lower back into the floor. Hold this position for 5 seconds, then relax. Do 3 sets of 10.   Partial curl: Lie on your back with your knees bent and your feet flat on the floor. Tighten your stomach muscles. Tuck your chin to your chest. With your hands stretched out in front of you, curl your upper body forward until your  shoulders clear the floor. Hold this position for 3 seconds. Don't hold your breath. It helps to breathe out as you lift your shoulders up. Relax back to the floor. Repeat 10 times. Build to 3 sets of 10. To challenge yourself, clasp your hands behind your head and keep your elbows out to the side.   Gluteal stretch: Lie on your back with both knees bent. Rest the ankle of one leg over the knee of your other leg. Grasp the thigh of the bottom leg and pull toward your chest. You will feel a stretch along the buttocks and possibly along the outside of your hip. Hold the stretch for 15 to 30 seconds. Repeat 3 times with each leg.   Extension exercise:   0. Lie face down on the floor for 5 minutes. If this hurts too much, lie face down with a pillow under your stomach. This should relieve your leg or back pain. When you can lie on your stomach for 5 minutes without a pillow, you can continue with Part B of this exercise.   0. After lying on your stomach for 5 minutes, prop yourself up on your elbows for another 5 minutes. If you can do this without having more leg or buttock pain, you can start doing part C of this exercise.   0. Lie on your stomach with your hands under your shoulders. Then press down on your hands and extend your elbows while keeping your hips flat on the floor. Hold for 1 second and lower yourself to the floor. Do 3 to 5 sets of 10 repetitions. Rest for 1 minute between sets. You should have no pain in your legs when you do this, but it is normal to feel some pain in your lower back.   Do this exercise several times a day.   Side plank: Lie on your side with your legs, hips, and shoulders in a straight line. Prop yourself up onto your forearm so your elbow is directly under your shoulder. Lift your hips off the floor and balance on your forearm and the outside of your foot. Try to hold this position for 15 seconds, then slowly lower your hip to the ground. Switch sides and repeat. Work up to holding  for 1 minute or longer. This exercise can be made easier by starting with your knees and hips flexed toward your chest.   Published by [employer].  This content is reviewed periodically and is subject to change as new health information becomes available. The information is intended to inform and educate and is not a replacement for medical evaluation, advice, diagnosis or treatment by a healthcare professional.   Written by Eva Ibarra, MS, PT, and Viry Kaye PT, [facility], [facility], for [facility]   ? 2010 [facility] and/or its affiliates. All Rights Reserved.         Copyright   [employer] 2011

## 2018-06-27 NOTE — [place]
[facility] [place]  [address]  [address]  [place] [id number] [phone]      REPORT OF WORK ABILITY    NOTE TO EMPLOYEE: You must promptly provide a copy of this report to your  employer or worker's compensation insurer, and Qualified Rehabilitation Consultant.    Date: 6/27/2018                     Employee Name: Cody Muñiz         YOB: 1990  Medical Record Number: [id number]   Soc.Sec.No: [id number]  Employer: [employer]              Date of Injury: 6/25/18  Managed Care Organization / Insurance Company Name: UNKNOWN    Diagnosis: LBP  Work Related: no        EMPLOYEE IS ABLE TO WORK: Return to work with restrictions on 6/28/18     RESTRICTIONS IF ANY:       Stand:  Continuously (6-8 hours)  Sit:  Continuously (6-8 hours)  Walk: Continuously (6-8 hours)  Kneel/[place]:  Not at all (0 hours)  Lift, carry no more than:  10 lb. or less  Push/Pull:  None  Shoulder/Elbow:  No restrictions  Hand/Wrist:  No restrictions  Ladder/Stair climb:  Not at all (0 hours)  Bend:  Not at all (0 hours)  Stoop:  Not at all (0 hours)  Twist:  Not at all (0 hours)  Reach Above Shoulders:  Frequently (4-6 hours)    OTHER RESTRICTIONS: None    TREATMENT PLAN/NOTES: rest, modification of activities, NSAIDs, heat/ice, physical therapy     Jazmín Hamilton PA-C

## 2018-06-27 NOTE — MR AVS SNAPSHOT
After Visit Summary   6/27/2018    Cody Muñiz    MRN: [id number]           Patient Information     Date Of Birth          1990        Visit Information        Provider Department      6/27/2018 1:10 PM Jazmín Hamilton PA-C [facility]        Today's Diagnoses     Acute midline low back pain with right-sided sciatica    -  1      Care Instructions             Low Back Pain            What is low back pain?   Low back pain is pain and stiffness in the lower back. It is one of the most common reasons people miss work.   How does it occur?   Your lower back is called your lumbar spine. It is made up of 5 bones called lumbar vertebrae. In between the vertebrae are shock absorbers called disks. Back pain can occur from an injury to the vertebrae or when a disk bulges or herniates.   Low back pain is usually caused when a ligament or muscle holding a vertebra in its proper position is strained. Vertebrae are bones that make up the spinal column through which the spinal cord passes. When these muscles or ligaments become weak or strained, the spine loses its stability, resulting in pain.   Low back pain can occur if your job involves lifting and carrying heavy objects, or if you spend a lot of time sitting or standing in one position or bending over. It can be caused by a fall or by unusually strenuous exercise. It can be brought on by the tension and stress that cause headaches in some people. It can even be brought on by violent sneezing or coughing.   People who are overweight may have low back pain because of the added stress on their back.   Back pain may occur when the muscles, joints, bones, and connective tissues of the back become inflamed as a result of an infection or an immune system problem. Arthritic disorders as well as some congenital and degenerative conditions may cause back pain.   Back pain accompanied by loss of bladder or bowel control, trouble moving your  legs, or numbness or tingling in your arms or legs requires immediate medical treatment.   What are the symptoms?   Symptoms include:   pain in the back or legs   stiffness, spasm, or limited motion   The pain may be constant or may happen only in certain positions. It may get worse when you cough, sneeze, bend, twist, or strain during a bowel movement. The pain may be in only one spot or may spread to other areas, most commonly down the buttocks and into the back of the thigh.   A low back strain typically does not produce pain past the knee into the calf or foot. Tingling or numbness in the calf or foot may indicate a herniated disk or pinched nerve.   Be sure to see your healthcare provider if:   You have weakness in your leg, especially if you cannot lift your foot, because this may be a sign of nerve damage.   You have new bowel or bladder problems as well as back pain, which may be a sign of severe injury to your spinal cord.   You have pain that gets worse despite treatment.   How is it diagnosed?   Your healthcare provider will review your medical history and examine you. You may have X-rays, an MRI, CT scan, or a bone scan.   How is it treated?   To treat this condition:   Put an ice pack, gel pack, or package of frozen vegetables, wrapped in a cloth on the area every 3 to 4 hours, for up to 20 minutes at a time for the first 2 or 3 days.   Use a heating pad or hot water bottle. Don't let the heating pad get too hot, and don't fall asleep with it. You could get a burn.   Rest in bed on a firm mattress. Often it helps to lie on your back with your knees raised on a pillow. However, some people prefer to lie on their side with their knees bent. It's best to try to stay active, so try not to rest in bed longer than 1 to 2 days.   Take muscle relaxants as recommended by your healthcare provider.   Take an anti-inflammatory such as ibuprofen, or other medicine as directed by your provider. Nonsteroidal  anti-inflammatory medicines (NSAIDs) may cause stomach bleeding and other problems. These risks increase with age. Read the label and take as directed. Unless recommended by your healthcare provider, do not take for more than 10 days.   Get a back massage by a trained person.   Wear a belt or corset to support your back.   Do the exercises recommended by your provider. Your provider may also prescribe physical therapy.   Talk with a counselor, if your back pain is related to tension caused by emotional problems.   When the pain is gone, ask your healthcare provider about starting an exercise program such as the following:   Exercise moderately every day, using stretching and warm-up exercises suggested by your provider or physical therapist.   Exercise vigorously for about 30 minutes 3 times a week by walking, swimming, using a stationary bicycle, or doing low-impact aerobics.   Exercising regularly will not only help your back, it will also help keep you healthier overall.   How long will the effects last?   The effects of back pain last as long as the cause exists or until your body recovers from the strain, usually a day or two but sometimes weeks.   How can I take care of myself?   In addition to the treatment described above, keep in mind these suggestions:   Practice good posture. Stand with your head up, shoulders straight, chest forward, weight balanced evenly on both feet, and pelvis tucked in.   Lose weight if you are overweight   Keep your core muscles strong. These are your abdominal and back muscles.   Sleep without a pillow under your head.   Pain is the best way to  the pace you should set in increasing your activity and exercise. Minor discomfort, stiffness, soreness, and mild aches need not interfere with activity. However, limit your activities temporarily if:   Your symptoms return.   The pain increases when you are more active.   The pain increases within 24 hours after a new or higher level  of activity.   When can I return to my normal activities?   Everyone recovers from an injury at a different rate. Return to your activities depends on how soon your back recovers, not by how many days or weeks it has been since your injury has occurred. In general, the longer you have symptoms before you start treatment, the longer it will take to get better. The goal is to return to your normal activities as soon as is safely possible. If you return too soon you may worsen your injury.   It is important that you have fully recovered from your low back pain before you return to any strenuous activity. You must be able to have the same range of motion that you had before your injury. You must be able to walk and twist without pain.   What can I do to help prevent low back pain?   You can reduce the strain on your back by doing the following:   Don't push with your arms when you move a heavy object. Turn around and push backwards so the strain is taken by your legs.   Whenever you sit, sit in a straight-backed chair and hold your spine against the back of the chair.   Bend your knees and hips and keep your back straight when you lift a heavy object.   Avoid lifting heavy objects higher than your waist.   Hold packages you carry close to your body, with your arms bent.   Use a footrest for one foot when you stand or sit in one spot for a long time. This keeps your back straight.   Bend your knees when you bend over.   Sit close to the pedals when you drive and use your seat belt and a hard backrest or pillow.   Lie on your side with your knees bent when you sleep or rest. It may help to put a pillow between your knees.   Put a pillow under your knees when you sleep on your back.   Raise the foot of the bed 8 inches to discourage sleeping on your stomach unless you have other problems that require that you keep your head elevated.   To rest your back, hold each of these positions for 5?minutes or longer:   Lie on your  back, bend your knees, and put pillows under your knees.   Lie on your back on the floor with a pillow under your neck. Bend your knees to a 90-degree angle, and put your lower legs and feet on a chair.   Lie on your back, bend your knees, and bring one knee up to your chest and hold it there. Repeat with the other knee, then bring both knees to your chest. When holding your knee to your chest, grab your thigh rather than your lower leg to avoid over flexing your knee.     Published by [employer].  This content is reviewed periodically and is subject to change as new health information becomes available. The information is intended to inform and educate and is not a replacement for medical evaluation, advice, diagnosis or treatment by a healthcare professional.   Developed by Viry Up RN, [place], and [employer].   ? 2010 [facility] and/or its affiliates. All Rights Reserved.           Low Back Pain Exercise          Standing hamstring stretch: Put the heel of one leg on a stool about 15 inches high. Keep your leg straight. Lean forward, bending at the hips until you feel a mild stretch in the back of your thigh. Make sure you do not roll your shoulders or bend at the waist when doing this. You want to stretch your leg, not your lower back. Hold the stretch for 15 to 30 seconds. Repeat with each leg 3 times.   Cat and camel: Get down on your hands and knees. Let your stomach sag, allowing your back to curve downward. Hold this position for 5 seconds. Then arch your back and hold for 5 seconds. Do 3 sets of 10.   Quadruped arm and leg raise: Get down on your hands and knees. Pull in your belly button and tighten your abdominal muscles to stiffen your spine. While keeping your abdominals tight, raise one arm and the opposite leg away from you. Hold this position for 5 seconds. Lower your arm and leg slowly and change sides. Do this 10 times on each side.   Pelvic tilt: Lie on your back with your knees bent and  your feet flat on the floor. Tighten your abdominal muscles and push your lower back into the floor. Hold this position for 5 seconds, then relax. Do 3 sets of 10.   Partial curl: Lie on your back with your knees bent and your feet flat on the floor. Tighten your stomach muscles. Tuck your chin to your chest. With your hands stretched out in front of you, curl your upper body forward until your shoulders clear the floor. Hold this position for 3 seconds. Don't hold your breath. It helps to breathe out as you lift your shoulders up. Relax back to the floor. Repeat 10 times. Build to 3 sets of 10. To challenge yourself, clasp your hands behind your head and keep your elbows out to the side.   Gluteal stretch: Lie on your back with both knees bent. Rest the ankle of one leg over the knee of your other leg. Grasp the thigh of the bottom leg and pull toward your chest. You will feel a stretch along the buttocks and possibly along the outside of your hip. Hold the stretch for 15 to 30 seconds. Repeat 3 times with each leg.   Extension exercise:   0. Lie face down on the floor for 5 minutes. If this hurts too much, lie face down with a pillow under your stomach. This should relieve your leg or back pain. When you can lie on your stomach for 5 minutes without a pillow, you can continue with Part B of this exercise.   0. After lying on your stomach for 5 minutes, prop yourself up on your elbows for another 5 minutes. If you can do this without having more leg or buttock pain, you can start doing part C of this exercise.   0. Lie on your stomach with your hands under your shoulders. Then press down on your hands and extend your elbows while keeping your hips flat on the floor. Hold for 1 second and lower yourself to the floor. Do 3 to 5 sets of 10 repetitions. Rest for 1 minute between sets. You should have no pain in your legs when you do this, but it is normal to feel some pain in your lower back.   Do this exercise several  times a day.   Side plank: Lie on your side with your legs, hips, and shoulders in a straight line. Prop yourself up onto your forearm so your elbow is directly under your shoulder. Lift your hips off the floor and balance on your forearm and the outside of your foot. Try to hold this position for 15 seconds, then slowly lower your hip to the ground. Switch sides and repeat. Work up to holding for 1 minute or longer. This exercise can be made easier by starting with your knees and hips flexed toward your chest.   Published by [facility].  This content is reviewed periodically and is subject to change as new health information becomes available. The information is intended to inform and educate and is not a replacement for medical evaluation, advice, diagnosis or treatment by a healthcare professional.   Written by Eva Ibarra MS, PT, and Viry Kaye PT, [facility], [facility], for [facility]   ? 2010 [facility] and/or its affiliates. All Rights Reserved.         Copyright   [employer] 2011                      Follow-ups after your visit        Your next 10 appointments already scheduled     Jul 03, 2018  4:30 PM CDT   (Arrive by 4:15 PM)   DAJUAN Spine with Klaus Gomez PT   [facility] ([facility]  )    [address] [place] [address]  [address]  [facility] [address]   [phone]            Jul 11, 2018  4:00 PM CDT   DAJUAN Spine with Klaus Gomez PT   [facility] ([facility]  )    [address] [place] [address]  [address]  [facility] [address]   [phone]            Jul 18, 2018  4:00 PM CDT   DAJUAN Spine with Klaus Gomez PT   [facility] ([facility]  )    [address] [place] [address]  [address]  [facility] [address]   [phone]              Who to contact     If you have questions or need follow up information about today's clinic visit or your schedule please contact [facility] directly at [phone].  Normal or  "non-critical lab and imaging results will be communicated to you by MyChart, letter or phone within 4 business days after the clinic has received the results. If you do not hear from us within 7 days, please contact the clinic through [employer] or phone. If you have a critical or abnormal lab result, we will notify you by phone as soon as possible.  Submit refill requests through [employer] or call your pharmacy and they will forward the refill request to us. Please allow 3 business days for your refill to be completed.          Additional Information About Your Visit        [employer] Information     [employer] lets you send messages to your doctor, view your test results, renew your prescriptions, schedule appointments and more. To sign up, go to [email or web address] . Click on \"Log in\" on the left side of the screen, which will take you to the Welcome page. Then click on \"Sign up Now\" on the right side of the page.     You will be asked to enter the access code listed below, as well as some personal information. Please follow the directions to create your username and password.     Your access code is: [email or web address]  Expires: 2018  1:10 PM     Your access code will  in 90 days. If you need help or a new code, please call your [place] clinic or [phone].        Care EveryWhere ID     This is your Care EveryWhere ID. This could be used by other organizations to access your [place] medical records  [id number]        Your Vitals Were     Pulse Temperature Height Pulse Oximetry BMI (Body Mass Index)       124 99.1  F (37.3  C) (Oral) 5' 11\" (1.803 m) 97% 26.22 kg/m2        Blood Pressure from Last 3 Encounters:   18 138/84   18 (!) 134/106   18 142/86    Weight from Last 3 Encounters:   18 188 lb (85.3 kg)   18 188 lb (85.3 kg)   18 189 lb 14.4 oz (86.1 kg)              Today, you had the following     No orders found for display         Today's Medication Changes        "   These changes are accurate as of 6/27/18  1:18 PM.  If you have any questions, ask your nurse or doctor.               Start taking these medicines.        Dose/Directions    cyclobenzaprine 10 MG tablet   Commonly known as:  FLEXERIL   Used for:  Acute midline low back pain with right-sided sciatica   Started by:  Jazmín Hamilton PA-C        Dose:  10 mg   Take 1 tablet (10 mg) by mouth 3 times daily as needed for muscle spasms   Quantity:  15 tablet   Refills:  0            Where to get your medicines      These medications were sent to [id number] - [place] - [address]  AT [facility]  [address] [place] [address]     Phone:  [phone]     cyclobenzaprine 10 MG tablet                Primary Care Provider Office Phone # Fax #    Alexis Garrison [phone] [address] [place] [address]        Equal Access to Services     [facility]: Hadii pauly ku hadasho Soomaali, waaxda luqadaha, qaybta kaalmada adeegyada, waxay cindyin hayyennifer raman . So [facility] [phone].    ATENCIÓN: Si ellis mai, tiene a rivera disposición servicios gratuitos de asistencia lingüística. Llame al [phone].    We comply with applicable federal civil rights laws and [place] laws. We do not discriminate on the basis of race, color, national origin, age, disability, sex, sexual orientation, or gender identity.            Thank you!     Thank you for choosing [facility]  for your care. Our goal is always to provide you with excellent care. Hearing back from our patients is one way we can continue to improve our services. Please take a few minutes to complete the written survey that you may receive in the mail after your visit with us. Thank you!             Your Updated Medication List - Protect others around you: Learn how to safely use, store and throw away your medicines at www.disposemymeds.org.           This list is accurate as of 6/27/18  1:18 PM.  Always use your most recent med list.                   Brand Name Dispense Instructions for use Diagnosis    amoxicillin-clavulanate 875-125 MG per tablet    AUGMENTIN     TAKE 1 TABLET BY MOUTH TWICE A DAY        cyclobenzaprine 10 MG tablet    FLEXERIL    15 tablet    Take 1 tablet (10 mg) by mouth 3 times daily as needed for muscle spasms    Acute midline low back pain with right-sided sciatica

## 2018-07-02 ENCOUNTER — OFFICE VISIT (OUTPATIENT)
Dept: PEDIATRICS | Facility: CLINIC | Age: 28
End: 2018-07-02
Payer: COMMERCIAL

## 2018-07-02 ENCOUNTER — TELEPHONE (OUTPATIENT)
Dept: PEDIATRICS | Facility: CLINIC | Age: 28
End: 2018-07-02

## 2018-07-02 VITALS
BODY MASS INDEX: 26.36 KG/M2 | SYSTOLIC BLOOD PRESSURE: 136 MMHG | DIASTOLIC BLOOD PRESSURE: 70 MMHG | TEMPERATURE: 98.2 F | WEIGHT: 188.31 LBS | OXYGEN SATURATION: 96 % | HEART RATE: 122 BPM | HEIGHT: 71 IN

## 2018-07-02 DIAGNOSIS — M54.41 ACUTE RIGHT-SIDED LOW BACK PAIN WITH RIGHT-SIDED SCIATICA: Primary | ICD-10-CM

## 2018-07-02 PROCEDURE — 99214 OFFICE O/P EST MOD 30 MIN: CPT | Performed by: INTERNAL MEDICINE

## 2018-07-02 NOTE — MR AVS SNAPSHOT
After Visit Summary   7/2/2018    Cody Muñiz    MRN: [id number]           Patient Information     Date Of Birth          1990        Visit Information        Provider Department      7/2/2018 8:20 AM Alexis Garrison MD [facility]        Care Instructions    Follow up for complete physcial exam within 6 months.    I'll await the attending Physician Statement.    Alexis Garrison MD  Internal Medicine and Pediatrics           Follow-ups after your visit        Follow-up notes from your care team     Return in about 6 months (around 1/2/2019) for Physical Exam.      Your next 10 appointments already scheduled     Jul 03, 2018  4:30 PM CDT   (Arrive by 4:15 PM)   DAJUAN Spine with Klaus Gomez PT   [facility] ([facility]  )    [address] [place] [address]  [address]  [facility] [address]   [phone]            Jul 11, 2018  4:00 PM CDT   DAJUAN Spine with Klaus Gomez PT   [facility] ([facility]  )    [address] [place] [address]  [address]  [facility] [address]   [phone]            Jul 18, 2018  4:00 PM CDT   DAJUAN Spine with Klaus Gomez PT   [facility] ([facility]  )    [address] [place] [address]  [address]  [facility] [address]   [phone]              Who to contact     If you have questions or need follow up information about today's clinic visit or your schedule please contact [facility] directly at [phone].  Normal or non-critical lab and imaging results will be communicated to you by MyChart, letter or phone within 4 business days after the clinic has received the results. If you do not hear from us within 7 days, please contact the clinic through MyChart or phone. If you have a critical or abnormal lab result, we will notify you by phone as soon as possible.  Submit refill requests through [employer] or call your pharmacy and they will forward the refill request to us. Please allow 3 business  "days for your refill to be completed.          Additional Information About Your Visit        MyChart Information     [employer] lets you send messages to your doctor, view your test results, renew your prescriptions, schedule appointments and more. To sign up, go to [email or web address] . Click on \"Log in\" on the left side of the screen, which will take you to the Welcome page. Then click on \"Sign up Now\" on the right side of the page.     You will be asked to enter the access code listed below, as well as some personal information. Please follow the directions to create your username and password.     Your access code is: [email or web address]  Expires: 2018  1:10 PM     Your access code will  in 90 days. If you need help or a new code, please call your [place] clinic or [phone].        Care EveryWhere ID     This is your [facility] EveryWhere ID. This could be used by other organizations to access your [place] medical records  [id number]        Your Vitals Were     Pulse Temperature Height Pulse Oximetry BMI (Body Mass Index)       122 98.2  F (36.8  C) (Tympanic) 5' 11\" (1.803 m) 96% 26.26 kg/m2        Blood Pressure from Last 3 Encounters:   18 136/70   18 138/84   18 (!) 134/106    Weight from Last 3 Encounters:   18 188 lb 5 oz (85.4 kg)   18 188 lb (85.3 kg)   18 188 lb (85.3 kg)              Today, you had the following     No orders found for display       Primary Care Provider Office Phone # Fax #    Alexis Garrison [phone] [address] [place] [address]        Equal Access to Services     [facility] : Christal Payne, hayley mason, megan garcia. So [facility] [phone].    ATENCIÓN: Si habla español, tiene a rivera disposición servicios gratuitos de asistencia lingüística. Llame al [phone].    We comply with applicable federal civil rights laws and " [place] laws. We do not discriminate on the basis of race, color, national origin, age, disability, sex, sexual orientation, or gender identity.            Thank you!     Thank you for choosing [facility]  for your care. Our goal is always to provide you with excellent care. Hearing back from our patients is one way we can continue to improve our services. Please take a few minutes to complete the written survey that you may receive in the mail after your visit with us. Thank you!             Your Updated Medication List - Protect others around you: Learn how to safely use, store and throw away your medicines at www.disposemymeds.org.          This list is accurate as of 7/2/18  8:32 AM.  Always use your most recent med list.                   Brand Name Dispense Instructions for use Diagnosis    amoxicillin-clavulanate 875-125 MG per tablet    AUGMENTIN     TAKE 1 TABLET BY MOUTH TWICE A DAY        cyclobenzaprine 10 MG tablet    FLEXERIL    15 tablet    Take 1 tablet (10 mg) by mouth 3 times daily as needed for muscle spasms    Acute midline low back pain with right-sided sciatica

## 2018-07-02 NOTE — PROGRESS NOTES
"  SUBJECTIVE:   Cody Muñiz is a 27 year old male who presents to clinic today for the following health issues:      Follow up on back pain; needs FMLA paper work filled out.     Seen here on 6/27 for back pain again.  Was given restrictions at that point, but now pain is gone.  Only took flexeril (cyclobenzaprine) once.  No pain now for the last 4 days.    I will be getting a \"attending Physician Statement\" from [facility].    Problem list and histories reviewed & adjusted, as indicated.  Additional history: as documented    Patient Active Problem List   Diagnosis     Generalized anxiety disorder     ADHD (attention deficit hyperactivity disorder)     No past surgical history on file.    Social History   Substance Use Topics     Smoking status: Former Smoker     Quit date: 11/21/2009     Smokeless tobacco: Never Used     Alcohol use Yes      Comment: drinks a couple times a week 3 drinks     Family History   Problem Relation Age of Onset     Cancer Mother      lung     Cancer - colorectal Maternal Grandfather      Prostate Cancer Paternal Grandfather          Current Outpatient Prescriptions   Medication Sig Dispense Refill     amoxicillin-clavulanate (AUGMENTIN) 875-125 MG per tablet TAKE 1 TABLET BY MOUTH TWICE A DAY  0     cyclobenzaprine (FLEXERIL) 10 MG tablet Take 1 tablet (10 mg) by mouth 3 times daily as needed for muscle spasms 15 tablet 0     No Known Allergies  BP Readings from Last 3 Encounters:   07/02/18 136/70   06/27/18 138/84   06/26/18 (!) 134/106    Wt Readings from Last 3 Encounters:   07/02/18 188 lb 5 oz (85.4 kg)   06/27/18 188 lb (85.3 kg)   06/26/18 188 lb (85.3 kg)                  Labs reviewed in EPIC    Reviewed and updated as needed this visit by clinical staff       Reviewed and updated as needed this visit by Provider         ROS:  CONSTITUTIONAL: NEGATIVE for fever, chills, change in weight  ENT/MOUTH: NEGATIVE for ear, mouth and throat problems  RESP: NEGATIVE for significant cough " "or SOB  CV: NEGATIVE for chest pain, palpitations or peripheral edema    OBJECTIVE:                                                    /70 (BP Location: Right arm, Patient Position: Chair, Cuff Size: Adult Large)  Pulse 122  Temp 98.2  F (36.8  C) (Tympanic)  Ht 5' 11\" (1.803 m)  Wt 188 lb 5 oz (85.4 kg)  SpO2 96%  BMI 26.26 kg/m2  Body mass index is 26.26 kg/(m^2).   GENERAL: healthy, alert, well nourished, well hydrated, no distress  HENT: ear canals- normal; TMs- normal; Nose- normal; Mouth- no ulcers, no lesions  NECK: no tenderness, no adenopathy, no asymmetry, no masses, no stiffness; thyroid- normal to palpation  RESP: lungs clear to auscultation - no rales, no rhonchi, no wheezes  CV: regular rates and rhythm, normal S1 S2, no S3 or S4 and no murmur, no click or rub -  ABDOMEN: soft, no tenderness, no  hepatosplenomegaly, no masses, normal bowel sounds  MS:  Improved. No significant pain in lower back. Past pain was midling to right paraverterbral.  negative SLT bilaterally.  Hip full range of motion.    Diagnostic test results:  Diagnostic Test Results:  none      ASSESSMENT/PLAN:                                                    Low back pain:    Resolved.  May return to work without restrictions.  physical therapy to begin tomorrow, weekly for 3 weeks.     I will await [employer]'s attending statement.  He was off work 6/25 through 7/2 for low back pain. Return without resctrictions.      Patient Instructions   Follow up for complete physcial exam within 6 months.    I'll await the attending Physician Statement.    Alexis Garrison MD  Internal Medicine and Pediatrics     E4: Spent 25 minutes face to face.     More than 50% of the time was spent in counseling and coordination of care of filling out forms and discussing issues.     Alexis Garrison MD  [facility] [place]  "

## 2018-07-02 NOTE — LETTER
July 2, 2018      Cody Muñiz  [address]   [place] [address]        To Whom It May Concern:    Cody Muñiz was seen in our clinic.    He was off 6/25 through 7/2/18.  HE may return to work on 7/3/18.    Sincerely,        Alexis Garrison MD

## 2018-07-02 NOTE — TELEPHONE ENCOUNTER
Faxed short term disability forms back to [facility] fax# [phone]. Placed copy in abstraction  Jennifer Guzman MA 7/2/2018 9:19 AM

## 2018-07-03 ENCOUNTER — THERAPY VISIT (OUTPATIENT)
Dept: PHYSICAL THERAPY | Facility: CLINIC | Age: 28
End: 2018-07-03
Attending: INTERNAL MEDICINE
Payer: COMMERCIAL

## 2018-07-03 DIAGNOSIS — M54.50 LUMBAGO: Primary | ICD-10-CM

## 2018-07-03 PROCEDURE — 97110 THERAPEUTIC EXERCISES: CPT | Mod: GP | Performed by: PHYSICAL THERAPIST

## 2018-07-03 PROCEDURE — 97161 PT EVAL LOW COMPLEX 20 MIN: CPT | Mod: GP | Performed by: PHYSICAL THERAPIST

## 2018-07-03 NOTE — PROGRESS NOTES
[facility] Initial Evaluation  Subjective:  Patient is a 27 year old male presenting with rehab back hpi.   Cody Muñiz is a 27 year old male with a lumbar condition.      This is a new and recurrent condition  Patient reports having long standing history of low back pain.   Recently had a flare up late June 2018.   Woke up with pain in the back and had a hard time getting off the couch.    Previous flare ups would last the work week and be better once able to take some time from work.    Normally the pain would be right greater than left low back.  Previously right sciatic symptoms to the foot.   This episode pain right low back.  Monday pain was 8/10 by today 3/10.    Worse: sit to stand, bending, lifting.  Better: resting some, heat/ice.    .        Pain is described as sharp and aching and is intermittent and reported as 3/10.                General health as reported by patient is good.    Medical allergies: no.  Other surgeries include:  No.  Current medications:  Muscle relaxants.  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  Standing most of the day.  Lifting 25lbs (20x).  Hobbies: plays "[employer].   .  Patient is working in normal job with restrictions.      Barriers include:  None as reported by the patient.    Red flags:  None as reported by the patient.                        Objective:  System         Lumbar/SI Evaluation  ROM:      Strength: glute medius 4/5, glute max 4/5, hip flexion 4+/5, lower abs 4-/5.                                                                    [place] Lumbar Evaluation    Posture:  Sitting: poor  Standing: fair          Movement Loss:  Flexion (Flex): min  Extension (EXT): min  Side Glide R (SG R): nil and pain  Side [place] L (SG L): nil and pain  Test Movements:    EIS: During: produces  After: no worse  Mechanical Response: no effect  Repeat EIS: During: decreases  After: better  Mechanical Response: IncROM            Conclusion:  derangement  Principle of Treatment:      Extension: press ups                                           ROS    Assessment/Plan:    Patient is a 27 year old male with lumbar complaints.    Patient has the following significant findings with corresponding treatment plan.                Diagnosis 1:  LBP  Pain -  hot/cold therapy, self management, education, directional preference exercise and home program  Decreased ROM/flexibility - manual therapy, therapeutic exercise and home program  Decreased strength - therapeutic exercise, therapeutic activities and home program  Impaired muscle performance - neuro re-education and home program  Decreased function - therapeutic activities and home program    Therapy Evaluation Codes:   1) History comprised of:   Personal factors that impact the plan of care:      Time since onset of symptoms.    Comorbidity factors that impact the plan of care are:      None.     Medications impacting care: None.  2) Examination of Body Systems comprised of:   Body structures and functions that impact the plan of care:      Lumbar spine.   Activity limitations that impact the plan of care are:      Bending, Lifting and Sitting.  3) Clinical presentation characteristics are:   Stable/Uncomplicated.  4) Decision-Making    Low complexity using standardized patient assessment instrument and/or measureable assessment of functional outcome.  Cumulative Therapy Evaluation is: Low complexity.    Previous and current functional limitations:  (See Goal Flow Sheet for this information)    Short term and Long term goals: (See Goal Flow Sheet for this information)     Communication ability:  Patient appears to be able to clearly communicate and understand verbal and written communication and follow directions correctly.  Treatment Explanation - The following has been discussed with the patient:   RX ordered/plan of care  Anticipated outcomes  Possible risks and side effects  This patient would benefit from  PT intervention to resume normal activities.   Rehab potential is excellent.    Frequency:  1 X week, once daily  Duration:  for 5 weeks  Discharge Plan:  Achieve all LTG.  Independent in home treatment program.  Reach maximal therapeutic benefit.    Please refer to the daily flowsheet for treatment today, total treatment time and time spent performing 1:1 timed codes.

## 2018-07-03 NOTE — MR AVS SNAPSHOT
"              After Visit Summary   7/3/2018    Cody Muñiz    MRN: [id number]           Patient Information     Date Of Birth          1990        Visit Information        Provider Department      7/3/2018 4:30 PM Klaus Gomez PT [facility] [place]        Today's Diagnoses     Lumbago    -  1       Follow-ups after your visit        Your next 10 appointments already scheduled     Jul 11, 2018  4:00 PM CDT   [facility] Spine with Klaus Gomez PT   [facility] [place] ([facility] [place]  )    [address] [place] [address]  [address]  [place] [address]   [phone]            Jul 18, 2018  4:00 PM CDT   [facility] Spine with Klaus Gomez PT   [facility] [place] ([facility] [place]  )    [address] [place] [address]  [address]  [place] [address]   [phone]              Who to contact     If you have questions or need follow up information about today's clinic visit or your schedule please contact [facility] [place] directly at [phone].  Normal or non-critical lab and imaging results will be communicated to you by BookingBughart, letter or phone within 4 business days after the clinic has received the results. If you do not hear from us within 7 days, please contact the clinic through Plixit or phone. If you have a critical or abnormal lab result, we will notify you by phone as soon as possible.  Submit refill requests through [employer] or call your pharmacy and they will forward the refill request to us. Please allow 3 business days for your refill to be completed.          Additional Information About Your Visit        BookingBughart Information     [employer] lets you send messages to your doctor, view your test results, renew your prescriptions, schedule appointments and more. To sign up, go to [email or web address] . Click on \"Log in\" on the left side of the screen, which will take you to the Welcome page. Then click on \"Sign up Now\" on the right side of the page. "     You will be asked to enter the access code listed below, as well as some personal information. Please follow the directions to create your username and password.     Your access code is: [email or web address]  Expires: 2018  1:10 PM     Your access code will  in 90 days. If you need help or a new code, please call your [facility] clinic or [phone].        Care EveryWhere ID     This is your Care EveryWhere ID. This could be used by other organizations to access your [facility] medical records  [id number]         Blood Pressure from Last 3 Encounters:   18 136/70   18 138/84   18 (!) 134/106    Weight from Last 3 Encounters:   18 85.4 kg (188 lb 5 oz)   18 85.3 kg (188 lb)   18 85.3 kg (188 lb)              We Performed the Following     HC PT EVAL, LOW COMPLEXITY     [facility] INITIAL EVAL REPORT     THERAPEUTIC EXERCISES        Primary Care Provider Office Phone # Fax #    Alexis Garrison [phone] [address] [place] [address]        Equal Access to Services     [facility]: Hadii aad ku hadasho Soomaali, waaxda luqadaha, qaybta kaalmada adeegyada, megan harrell haylizn amy raman . So [facility] [phone].    ATENCIÓN: Si habla español, tiene a rivera disposición servicios gratuitos de asistencia lingüística. Llame al [phone].    We comply with applicable federal civil rights laws and [place] laws. We do not discriminate on the basis of race, color, national origin, age, disability, sex, sexual orientation, or gender identity.            Thank you!     Thank you for choosing [facility] [place]  for your care. Our goal is always to provide you with excellent care. Hearing back from our patients is one way we can continue to improve our services. Please take a few minutes to complete the written survey that you may receive in the mail after your visit with us. Thank you!             Your Updated Medication List  - Protect others around you: Learn how to safely use, store and throw away your medicines at www.disposemymeds.org.          This list is accurate as of 7/3/18  5:11 PM.  Always use your most recent med list.                   Brand Name Dispense Instructions for use Diagnosis    amoxicillin-clavulanate 875-125 MG per tablet    AUGMENTIN     TAKE 1 TABLET BY MOUTH TWICE A DAY        cyclobenzaprine 10 MG tablet    FLEXERIL    15 tablet    Take 1 tablet (10 mg) by mouth 3 times daily as needed for muscle spasms    Acute midline low back pain with right-sided sciatica

## 2018-07-09 ENCOUNTER — OFFICE VISIT (OUTPATIENT)
Dept: PEDIATRICS | Facility: CLINIC | Age: 28
End: 2018-07-09
Payer: COMMERCIAL

## 2018-07-09 VITALS
DIASTOLIC BLOOD PRESSURE: 70 MMHG | HEART RATE: 96 BPM | TEMPERATURE: 99 F | BODY MASS INDEX: 26.24 KG/M2 | OXYGEN SATURATION: 97 % | WEIGHT: 187.44 LBS | SYSTOLIC BLOOD PRESSURE: 110 MMHG | HEIGHT: 71 IN

## 2018-07-09 DIAGNOSIS — H65.93 MIDDLE EAR EFFUSION, BILATERAL: Primary | ICD-10-CM

## 2018-07-09 PROCEDURE — 99213 OFFICE O/P EST LOW 20 MIN: CPT | Performed by: PHYSICIAN ASSISTANT

## 2018-07-09 RX ORDER — FLUTICASONE PROPIONATE 50 MCG
2 SPRAY, SUSPENSION (ML) NASAL DAILY
Qty: 1 BOTTLE | Refills: 0 | Status: SHIPPED | OUTPATIENT
Start: 2018-07-09

## 2018-07-09 NOTE — PROGRESS NOTES
"  SUBJECTIVE:   Cody Muñiz is a 27 year old male who presents to clinic today for the following health issues:    Acute Illness   Acute illness concerns: ear infection  Onset: x3 days     Fever: no     Chills/Sweats: no     Headache (location?): no     Sinus Pressure:YES- facial pain    Conjunctivitis:  no    Ear Pain: YES: bilateral     Rhinorrhea: YES    Congestion: YES    Sore Throat: no      Cough: YES-non-productive    Wheeze: no     Decreased Appetite: no     Nausea: no     Vomiting: no     Diarrhea:  no     Dysuria/Freq.: no     Fatigue/Achiness: no     Sick/Strep Exposure: no      Therapies Tried and outcome: Augmentin for recent ear infection     ROS:  ROS otherwise negative    OBJECTIVE:                                                    /70 (BP Location: Right arm, Patient Position: Chair, Cuff Size: Adult Large)  Pulse 96  Temp 99  F (37.2  C) (Tympanic)  Ht 5' 11\" (1.803 m)  Wt 187 lb 7 oz (85 kg)  SpO2 97%  BMI 26.14 kg/m2  Body mass index is 26.14 kg/(m^2).   GENERAL: alert, no distress  HENT: ear canals- normal; TMs- effusions bilaterally; Nose- normal; Mouth- no ulcers, no lesions  NECK: no tenderness, no adenopathy  RESP: lungs clear to auscultation - no rales, no rhonchi, no wheezes  CV: regular rates and rhythm, normal S1 S2, no S3 or S4 and no murmur, no click or rub    Diagnostic test results:  No results found for this or any previous visit (from the past 24 hour(s)).     ASSESSMENT/PLAN:                                                    (H65.93) Middle ear effusion, bilateral  (primary encounter diagnosis)  Comment: begin flonase bilaterally and fluids  Plan: fluticasone (FLONASE) 50 MCG/ACT spray          Jazmín Hamilton PA-C  [facility] [place]  "

## 2018-07-09 NOTE — MR AVS SNAPSHOT
"              After Visit Summary   7/9/2018    Cody Muñiz    MRN: [id number]           Patient Information     Date Of Birth          1990        Visit Information        Provider Department      7/9/2018 3:30 PM Jazmín Hamilton PA-C [facility] [place]        Today's Diagnoses     Middle ear effusion, bilateral    -  1       Follow-ups after your visit        Your next 10 appointments already scheduled     Jul 11, 2018  4:00 PM CDT   [facility] Spine with Klaus Gomez PT   [facility] [place] ([facility] [place]  )    [address] [place] [address]  [address]  [place] [address]   [phone]            Jul 18, 2018  4:00 PM CDT   [facility] Spine with Klaus Gomez PT   [facility] [place] ([facility] [place]  )    [address] [place] [address]  [address]  [place] [address]   [phone]              Who to contact     If you have questions or need follow up information about today's clinic visit or your schedule please [facility] directly at [phone].  Normal or non-critical lab and imaging results will be communicated to you by CropUphart, letter or phone within 4 business days after the clinic has received the results. If you do not hear from us within 7 days, please contact the clinic through CropUphart or phone. If you have a critical or abnormal lab result, we will notify you by phone as soon as possible.  Submit refill requests through Swarm or call your pharmacy and they will forward the refill request to us. Please allow 3 business days for your refill to be completed.          Additional Information About Your Visit        MyChart Information     Swarm lets you send messages to your doctor, view your test results, renew your prescriptions, schedule appointments and more. To sign up, go to [email or web address] . Click on \"Log in\" on the left side of the screen, which will take you to the Welcome page. Then click on \"Sign up Now\" on the right side of " "the page.     You will be asked to enter the access code listed below, as well as some personal information. Please follow the directions to create your username and password.     Your access code is: [email or web address]  Expires: 2018  1:10 PM     Your access code will  in 90 days. If you need help or a new code, please call your [facility] clinic or [phone].        Care EveryWhere ID     This is your Care EveryWhere ID. This could be used by other organizations to access your [facility] [place] medical records  [id number]        Your Vitals Were     Pulse Temperature Height Pulse Oximetry BMI (Body Mass Index)       96 99  F (37.2  C) (Tympanic) 5' 11\" (1.803 m) 97% 26.14 kg/m2        Blood Pressure from Last 3 Encounters:   18 110/70   18 136/70   18 138/84    Weight from Last 3 Encounters:   18 187 lb 7 oz (85 kg)   18 188 lb 5 oz (85.4 kg)   18 188 lb (85.3 kg)              Today, you had the following     No orders found for display         Today's Medication Changes          These changes are accurate as of 18  3:48 PM.  If you have any questions, ask your nurse or doctor.               Start taking these medicines.        Dose/Directions    fluticasone 50 MCG/ACT spray   Commonly known as:  FLONASE   Used for:  Middle ear effusion, bilateral   Started by:  Jazmín Hamilton PA-C        Dose:  2 spray   Spray 2 sprays into both nostrils daily   Quantity:  1 Bottle   Refills:  0            Where to get your medicines      These medications were sent to [facility] - [address]   [address], [place] [address]     Phone:  [phone]     fluticasone 50 MCG/ACT spray                Primary Care Provider Office Phone # Fax #    Alexis Garrison [phone] [address] [place] [address]        Equal Access to Services     [facility] AH: Christal Payne, " wairmazara mason, qaybta kasona joya, megan reyes marciesylvia laCarinaaanydia ah. So [facility] [phone].    ATENCIÓN: Si ellis mai, tiene a rivera disposición servicios gratuitos de asistencia lingüística. Nadia al [phone].    We comply with applicable federal civil rights laws and [place] laws. We do not discriminate on the basis of race, color, national origin, age, disability, sex, sexual orientation, or gender identity.            Thank you!     Thank you for choosing [facility] [place]  for your care. Our goal is always to provide you with excellent care. Hearing back from our patients is one way we can continue to improve our services. Please take a few minutes to complete the written survey that you may receive in the mail after your visit with us. Thank you!             Your Updated Medication List - Protect others around you: Learn how to safely use, store and throw away your medicines at www.disposemymeds.org.          This list is accurate as of 7/9/18  3:48 PM.  Always use your most recent med list.                   Brand Name Dispense Instructions for use Diagnosis    amoxicillin-clavulanate 875-125 MG per tablet    AUGMENTIN     TAKE 1 TABLET BY MOUTH TWICE A DAY        cyclobenzaprine 10 MG tablet    FLEXERIL    15 tablet    Take 1 tablet (10 mg) by mouth 3 times daily as needed for muscle spasms    Acute midline low back pain with right-sided sciatica       fluticasone 50 MCG/ACT spray    FLONASE    1 Bottle    Spray 2 sprays into both nostrils daily    Middle ear effusion, bilateral

## 2018-07-17 ENCOUNTER — OFFICE VISIT (OUTPATIENT)
Dept: PEDIATRICS | Facility: CLINIC | Age: 28
End: 2018-07-17
Payer: COMMERCIAL

## 2018-07-17 VITALS
TEMPERATURE: 99.5 F | SYSTOLIC BLOOD PRESSURE: 138 MMHG | OXYGEN SATURATION: 97 % | HEART RATE: 129 BPM | HEIGHT: 71 IN | WEIGHT: 189.8 LBS | DIASTOLIC BLOOD PRESSURE: 88 MMHG | BODY MASS INDEX: 26.57 KG/M2

## 2018-07-17 DIAGNOSIS — J01.90 ACUTE SINUSITIS WITH SYMPTOMS > 10 DAYS: Primary | ICD-10-CM

## 2018-07-17 PROCEDURE — 99213 OFFICE O/P EST LOW 20 MIN: CPT | Performed by: PHYSICIAN ASSISTANT

## 2018-07-17 RX ORDER — CEFDINIR 300 MG/1
300 CAPSULE ORAL 2 TIMES DAILY
Qty: 20 CAPSULE | Refills: 0 | Status: SHIPPED | OUTPATIENT
Start: 2018-07-17 | End: 2018-07-19

## 2018-07-17 NOTE — MR AVS SNAPSHOT
"              After Visit Summary   7/17/2018    Cody Muñiz    MRN: [id number]           Patient Information     Date Of Birth          1990        Visit Information        Provider Department      7/17/2018 4:10 PM Jazmín Hamilton PA-C [facility] [place]        Today's Diagnoses     Acute sinusitis with symptoms > 10 days    -  1       Follow-ups after your visit        Your next 10 appointments already scheduled     Jul 18, 2018  4:00 PM CDT   [facility] with Klaus Gomez PT   [facility] [place] ([place]  )    [address]  [address]  [place] [address]   [phone]              Who to contact     If you have questions or need follow up information about today's clinic visit or your schedule please contact [facility] directly at [phone].  Normal or non-critical lab and imaging results will be communicated to you by MyChart, letter or phone within 4 business days after the clinic has received the results. If you do not hear from us within 7 days, please contact the clinic through MyChart or phone. If you have a critical or abnormal lab result, we will notify you by phone as soon as possible.  Submit refill requests through People [employer] Remember or call your pharmacy and they will forward the refill request to us. Please allow 3 business days for your refill to be completed.          Additional Information About Your Visit        MyChart Information     People [employer] Remember lets you send messages to your doctor, view your test results, renew your prescriptions, schedule appointments and more. To sign up, go to [email or web address] . Click on \"Log in\" on the left side of the screen, which will take you to the Welcome page. Then click on \"Sign up Now\" on the right side of the page.     You will be asked to enter the access code listed below, as well as some personal information. Please follow the directions to create your username and password.     Your access " "code is: [email or web address]  Expires: 2018  1:10 PM     Your access code will  in 90 days. If you need help or a new code, please call your [place] clinic or [phone].        Care EveryWhere ID     This is your Care EveryWhere ID. This could be used by other organizations to access your [place] medical records  [id number]        Your Vitals Were     Pulse Temperature Height Pulse Oximetry BMI (Body Mass Index)       129 99.5  F (37.5  C) (Oral) 5' 11\" (1.803 m) 97% 26.47 kg/m2        Blood Pressure from Last 3 Encounters:   18 138/88   18 110/70   18 136/70    Weight from Last 3 Encounters:   18 189 lb 12.8 oz (86.1 kg)   18 187 lb 7 oz (85 kg)   18 188 lb 5 oz (85.4 kg)              Today, you had the following     No orders found for display         Today's Medication Changes          These changes are accurate as of 18  4:32 PM.  If you have any questions, ask your nurse or doctor.               Start taking these medicines.        Dose/Directions    cefdinir 300 MG capsule   Commonly known as:  OMNICEF   Used for:  Acute sinusitis with symptoms > 10 days   Started by:  Jazmín Hamilton PA-C        Dose:  300 mg   Take 1 capsule (300 mg) by mouth 2 times daily   Quantity:  20 capsule   Refills:  0            Where to get your medicines      These medications were sent to [facility] - [address]   [address], [place] [address]     Phone:  [phone]     cefdinir 300 MG capsule                Primary Care Provider Office Phone # Fax #    Alexis Garrison [phone]       [address] [place] [address] [address] [place] [address]        Equal Access to Services     [facility] : Christal gibbs Sogreg, waaxda luqadaha, qaybta kaalmegan reis. [facility] [phone].    ATENCIÓN: Si ellis mai, tiene a rivera disposición servicios " urbano de asistencia lingüística. Nadia newton [phone].    We comply with applicable federal civil rights laws and [place] laws. We do not discriminate on the basis of race, color, national origin, age, disability, sex, sexual orientation, or gender identity.            Thank you!     Thank you for choosing [facility]  for your care. Our goal is always to provide you with excellent care. Hearing back from our patients is one way we can continue to improve our services. Please take a few minutes to complete the written survey that you may receive in the mail after your visit with us. Thank you!             Your Updated Medication List - Protect others around you: Learn how to safely use, store and throw away your medicines at www.disposemymeds.org.          This list is accurate as of 7/17/18  4:32 PM.  Always use your most recent med list.                   Brand Name Dispense Instructions for use Diagnosis    cefdinir 300 MG capsule    OMNICEF    20 capsule    Take 1 capsule (300 mg) by mouth 2 times daily    Acute sinusitis with symptoms > 10 days       cyclobenzaprine 10 MG tablet    FLEXERIL    15 tablet    Take 1 tablet (10 mg) by mouth 3 times daily as needed for muscle spasms    Acute midline low back pain with right-sided sciatica       fluticasone 50 MCG/ACT spray    FLONASE    1 Bottle    Spray 2 sprays into both nostrils daily    Middle ear effusion, bilateral

## 2018-07-17 NOTE — PROGRESS NOTES
"  SUBJECTIVE:   Cody Muñiz is a 27 year old male who presents to clinic today for the following health issues:    Acute Illness   Acute illness concerns: sinus  Onset: 2 weeks ago  And worsening  No work x 2 days    Fever: no    Chills/Sweats: YES- both    Headache (location?): no    Sinus Pressure:YES- facial pain    Conjunctivitis:  no    Ear Pain: YES: bilateral    Rhinorrhea: YES- clear    Congestion: YES- nasal and chest    Sore Throat: no     Cough: YES-non-productive    Wheeze: no    Decreased Appetite: YES    Nausea: no    Vomiting: no    Diarrhea:  no    Dysuria/Freq.: no    Fatigue/Achiness: YES- fatigue    Sick/Strep Exposure: no     Therapies Tried and outcome: Dayquil    Augmentin finished 2 week ago for ears.    ROS:  ROS otherwise negative    OBJECTIVE:                                                    /88 (BP Location: Right arm, Cuff Size: Adult Regular)  Pulse 129  Temp 99.5  F (37.5  C) (Oral)  Ht 5' 11\" (1.803 m)  Wt 189 lb 12.8 oz (86.1 kg)  SpO2 97%  BMI 26.47 kg/m2  Body mass index is 26.47 kg/(m^2).   GENERAL: alert, no distress  HENT: ear canals- normal; TMs- normal; Nose- normal; Mouth- no ulcers, no lesions; max sinus tenderness  NECK: no tenderness, no adenopathy  RESP: lungs clear to auscultation - no rales, no rhonchi, no wheezes  CV: regular rates and rhythm, normal S1 S2, no S3 or S4 and no murmur, no click or rub    Diagnostic test results:  No results found for this or any previous visit (from the past 24 hour(s)).     ASSESSMENT/PLAN:                                                    (J01.90) Acute sinusitis with symptoms > 10 days  (primary encounter diagnosis)  Comment: begin antibiotics as directed.   Plan: cefdinir (OMNICEF) 300 MG capsule          See Patient Instructions    Jazmín Hamilton PA-C  [facility]  "

## 2018-07-17 NOTE — LETTER
July 17, 2018      Cody Muñiz  [address]   [place] [address]        To Whom It May Concern:    Cody Muñiz  was seen on 7/17/18.  Please excuse him from 7/16-7/18 due to illness.        Sincerely,        Jazmín Hamilton PA-C

## 2018-07-19 ENCOUNTER — TELEPHONE (OUTPATIENT)
Dept: PEDIATRICS | Facility: CLINIC | Age: 28
End: 2018-07-19

## 2018-07-19 DIAGNOSIS — J01.01 ACUTE RECURRENT MAXILLARY SINUSITIS: Primary | ICD-10-CM

## 2018-07-19 RX ORDER — AZITHROMYCIN 250 MG/1
TABLET, FILM COATED ORAL
Qty: 6 TABLET | Refills: 0 | Status: SHIPPED | OUTPATIENT
Start: 2018-07-19 | End: 2018-08-03

## 2018-07-19 NOTE — TELEPHONE ENCOUNTER
Spoke with Cody - advised letter is at lower level  for .  Discussed switching abx if he is not feeling better within a couple days, advised Rx already sent to [facility].     Sunitha Kay MA   July 19, 2018,  11:10 AM

## 2018-07-19 NOTE — TELEPHONE ENCOUNTER
Patient was seen by Allison on 07/17 for sinusitis.  She wrote a note for him to be off of work for two days.  Patient doesn't feel well enough to return to work today either and is requesting a note to be off work.  Patient was prescribed Cefdinir.  Symptoms still present-pressure in his head with headache behind his eyes.  Please call patient when letter is ready for pick-up.  LEONARD Kinney RN

## 2018-07-19 NOTE — LETTER
July 20, 2018      Cody Muñiz  [address]   [place] [address]        To Whom It May Concern:    Cody Muñiz was seen in our clinic. Please excuse from work on 7/20 as well.    Sincerely,        Alexis Garrison MD

## 2018-07-19 NOTE — TELEPHONE ENCOUNTER
Letter in my outbox.      Call patient to .    Should also change his antibiotic to azithro if omnicef (cefdinir) not helping in another 1-2 days.  Faxed to our pharmacy.    Alexis Garrison MD  Internal Medicine and Pediatrics

## 2018-07-20 ENCOUNTER — OFFICE VISIT (OUTPATIENT)
Dept: PEDIATRICS | Facility: CLINIC | Age: 28
End: 2018-07-20
Payer: COMMERCIAL

## 2018-07-20 VITALS
BODY MASS INDEX: 26.32 KG/M2 | HEART RATE: 86 BPM | HEIGHT: 71 IN | DIASTOLIC BLOOD PRESSURE: 100 MMHG | SYSTOLIC BLOOD PRESSURE: 146 MMHG | TEMPERATURE: 99.1 F | WEIGHT: 188 LBS | OXYGEN SATURATION: 97 %

## 2018-07-20 DIAGNOSIS — J01.01 ACUTE RECURRENT MAXILLARY SINUSITIS: Primary | ICD-10-CM

## 2018-07-20 PROCEDURE — 99213 OFFICE O/P EST LOW 20 MIN: CPT | Performed by: INTERNAL MEDICINE

## 2018-07-20 NOTE — MR AVS SNAPSHOT
"              After Visit Summary   7/20/2018    Cody Muñiz    MRN: [id number]           Patient Information     Date Of Birth          1990        Visit Information        Provider Department      7/20/2018 1:00 PM Alexis Garrison MD [facility]        Today's Diagnoses     Acute recurrent maxillary sinusitis    -  1      Care Instructions    Saline spray (nonmedicated salt water) in small squirt bottles can be used every hour or two during the day, as can humidifiers during the night.  Steam showers can help keep mucous loose.       Expectorants (like \"Mucinex\" or \"Robitussin\") may help, as can using cough supressants (like the \"DM\" in Mucinex DM and Robitussin DM).    Might benefit from antihistamines like Zyrtec (cetirizine) for relief of congestion.    Finish the omnicef; ne need currently to add the azithromycin.     Alexis Garrison MD  Internal Medicine and Pediatrics               Follow-ups after your visit        Follow-up notes from your care team     Return in about 1 year (around 7/20/2019) for Physical Exam.      Who to contact     If you have questions or need follow up information about today's clinic visit or your schedule please contact [facility] directly at [phone].  Normal or non-critical lab and imaging results will be communicated to you by MyChart, letter or phone within 4 business days after the clinic has received the results. If you do not hear from us within 7 days, please contact the clinic through MyChart or phone. If you have a critical or abnormal lab result, we will notify you by phone as soon as possible.  Submit refill requests through [employer] or call your pharmacy and they will forward the refill request to us. Please allow 3 business days for your refill to be completed.          Additional Information About Your Visit        Care EveryWhere ID     This is your Care EveryWhere ID. This could be used by other organizations to access your [place] " "medical records  [id number]        Your Vitals Were     Pulse Temperature Height Pulse Oximetry BMI (Body Mass Index)       86 99.1  F (37.3  C) (Oral) 5' 11\" (1.803 m) 97% 26.22 kg/m2        Blood Pressure from Last 3 Encounters:   07/20/18 (!) 146/100   07/17/18 138/88   07/09/18 110/70    Weight from Last 3 Encounters:   07/20/18 188 lb (85.3 kg)   07/17/18 189 lb 12.8 oz (86.1 kg)   07/09/18 187 lb 7 oz (85 kg)              Today, you had the following     No orders found for display       Primary Care Provider Office Phone # Fax #    Alexis Garrison [phone] [address] [place] [address]        Equal Access to Services     [facility]: Hadii pauly velasco hadasho Soomaali, waaxda luqadaha, qaybta kaalmada adeegyada, megan raman . So [facility] [phone].    ATENCIÓN: Si habla español, tiene a rivera disposición servicios gratuitos de asistencia lingüística. Llame al [phone].    We comply with applicable federal civil rights laws and [place] laws. We do not discriminate on the basis of race, color, national origin, age, disability, sex, sexual orientation, or gender identity.            Thank you!     Thank you for choosing [facility] [place]  for your care. Our goal is always to provide you with excellent care. Hearing back from our patients is one way we can continue to improve our services. Please take a few minutes to complete the written survey that you may receive in the mail after your visit with us. Thank you!             Your Updated Medication List - Protect others around you: Learn how to safely use, store and throw away your medicines at www.disposemymeds.org.          This list is accurate as of 7/20/18  1:22 PM.  Always use your most recent med list.                   Brand Name Dispense Instructions for use Diagnosis    azithromycin 250 MG tablet    ZITHROMAX    6 tablet    Two tablets first day, then one tablet daily for four " days.    Acute recurrent maxillary sinusitis       cyclobenzaprine 10 MG tablet    FLEXERIL    15 tablet    Take 1 tablet (10 mg) by mouth 3 times daily as needed for muscle spasms    Acute midline low back pain with right-sided sciatica       fluticasone 50 MCG/ACT spray    FLONASE    1 Bottle    Spray 2 sprays into both nostrils daily    Middle ear effusion, bilateral

## 2018-07-20 NOTE — PROGRESS NOTES
SUBJECTIVE:   Cody Muñiz is a 27 year old male who presents to clinic today for the following health issues:      Pt was seen 7/17/18 for acute sinusitis, treated with omnicef: had been having chills, sweats, sinus symptoms and pressure behind nose.      Pt was recommended to switch abx to azithro as his sx have not been improving greatly, and plans to pick this up today. Has missed work all week, expects to return 7/23/18 (needs letter stating this. )    Feels mostly better today, but needed appointment for paperwork.      Has been taking Nyquil and Dayquil, but not currently.      Problem list and histories reviewed & adjusted, as indicated.  Additional history: as documented    Patient Active Problem List   Diagnosis     Generalized anxiety disorder     ADHD (attention deficit hyperactivity disorder)     Lumbago     History reviewed. No pertinent surgical history.    Social History   Substance Use Topics     Smoking status: Former Smoker     Quit date: 11/21/2009     Smokeless tobacco: Never Used     Alcohol use Yes      Comment: drinks a couple times a week 3 drinks     Family History   Problem Relation Age of Onset     Cancer Mother      lung     Cancer - colorectal Maternal Grandfather      Prostate Cancer Paternal Grandfather          Current Outpatient Prescriptions   Medication Sig Dispense Refill     cyclobenzaprine (FLEXERIL) 10 MG tablet Take 1 tablet (10 mg) by mouth 3 times daily as needed for muscle spasms 15 tablet 0     fluticasone (FLONASE) 50 MCG/ACT spray Spray 2 sprays into both nostrils daily 1 Bottle 0     azithromycin (ZITHROMAX) 250 MG tablet Two tablets first day, then one tablet daily for four days. (Patient not taking: Reported on 7/20/2018) 6 tablet 0     No Known Allergies  BP Readings from Last 3 Encounters:   07/20/18 (!) 146/100   07/17/18 138/88   07/09/18 110/70    Wt Readings from Last 3 Encounters:   07/20/18 188 lb (85.3 kg)   07/17/18 189 lb 12.8 oz (86.1 kg)   07/09/18  "187 lb 7 oz (85 kg)                  Labs reviewed in EPIC    Reviewed and updated as needed this visit by clinical staff       Reviewed and updated as needed this visit by Provider         ROS:  CONSTITUTIONAL: NEGATIVE for fever, chills, change in weight  ENT/MOUTH: NEGATIVE for ear, mouth and throat problems  RESP: NEGATIVE for significant cough or SOB  CV: NEGATIVE for chest pain, palpitations or peripheral edema    OBJECTIVE:                                                    BP (!) 146/100 (BP Location: Right arm, Cuff Size: Adult Large)  Pulse 86  Temp 99.1  F (37.3  C) (Oral)  Ht 5' 11\" (1.803 m)  Wt 188 lb (85.3 kg)  SpO2 97%  BMI 26.22 kg/m2  Body mass index is 26.22 kg/(m^2).   GENERAL: healthy, alert, well nourished, well hydrated, no distress  HENT: ear canals- normal; TMs- normal; Nose- normal; Mouth- no ulcers, no lesions  NECK: no tenderness, no adenopathy, no asymmetry, no masses, no stiffness; thyroid- normal to palpation  RESP: lungs clear to auscultation - no rales, no rhonchi, no wheezes  CV: regular rates and rhythm, normal S1 S2, no S3 or S4 and no murmur, no click or rub -  ABDOMEN: soft, no tenderness, no  hepatosplenomegaly, no masses, normal bowel sounds    Diagnostic test results:  Diagnostic Test Results:  none      ASSESSMENT/PLAN:                                                    1. Acute recurrent maxillary sinusitis  Symptoms improving. 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  Advised to hold off on working until 7/23.  Filled out [place] papers to this effect.       See Patient Instructions    Alexis Garrison MD  [facility] [place]    "

## 2018-07-20 NOTE — TELEPHONE ENCOUNTER
Patient calling in to request another letter/note to excuse him from work again today. He is still not feeling well.    Please call patient to notify.    Thanks  Perez MA  Team Coodinator

## 2018-07-20 NOTE — PATIENT INSTRUCTIONS
"Saline spray (nonmedicated salt water) in small squirt bottles can be used every hour or two during the day, as can humidifiers during the night.  Steam showers can help keep mucous loose.       Expectorants (like \"Mucinex\" or \"Robitussin\") may help, as can using cough supressants (like the \"DM\" in Mucinex DM and Robitussin DM).    Might benefit from antihistamines like Zyrtec (cetirizine) for relief of congestion.    Finish the omnicef; ne need currently to add the azithromycin.     Alexis Garrison MD  Internal Medicine and Pediatrics       "

## 2018-07-20 NOTE — TELEPHONE ENCOUNTER
Pt made an appointment to see Dr. Garrison today at 1pm, to do FMLA paperwork and will  letter then.     Sunitha Kay MA   July 20, 2018,  11:10 AM

## 2018-08-03 ENCOUNTER — OFFICE VISIT (OUTPATIENT)
Dept: PEDIATRICS | Facility: CLINIC | Age: 28
End: 2018-08-03
Payer: COMMERCIAL

## 2018-08-03 VITALS
TEMPERATURE: 98.9 F | OXYGEN SATURATION: 95 % | HEART RATE: 120 BPM | SYSTOLIC BLOOD PRESSURE: 116 MMHG | DIASTOLIC BLOOD PRESSURE: 80 MMHG | HEIGHT: 71 IN | BODY MASS INDEX: 25.73 KG/M2 | WEIGHT: 183.8 LBS

## 2018-08-03 DIAGNOSIS — H65.01 RIGHT ACUTE SEROUS OTITIS MEDIA, RECURRENCE NOT SPECIFIED: ICD-10-CM

## 2018-08-03 DIAGNOSIS — G43.109 MIGRAINE WITH AURA AND WITHOUT STATUS MIGRAINOSUS, NOT INTRACTABLE: Primary | ICD-10-CM

## 2018-08-03 PROCEDURE — 99214 OFFICE O/P EST MOD 30 MIN: CPT | Mod: 25 | Performed by: PHYSICIAN ASSISTANT

## 2018-08-03 PROCEDURE — 96372 THER/PROPH/DIAG INJ SC/IM: CPT | Performed by: PHYSICIAN ASSISTANT

## 2018-08-03 RX ORDER — SUMATRIPTAN 50 MG/1
50 TABLET, FILM COATED ORAL
Qty: 9 TABLET | Refills: 0 | Status: SHIPPED | OUTPATIENT
Start: 2018-08-03 | End: 2018-09-20

## 2018-08-03 NOTE — PROGRESS NOTES
"  SUBJECTIVE:   Cody Muñiz is a 27 year old male who presents to clinic today for the following health issues:    Headache  Onset: x5 days    Description:   Location: behind left eye  Character: throbbing pain  Frequency:  3-4 per day  Duration:  Several hours    Intensity: severe    Progression of Symptoms:  improving and intermittent    Accompanying Signs & Symptoms:  Stiff neck: no  Neck or upper back pain: YES- between shoulders  Fever: no  Sinus pressure: YES- slight  Nausea or vomiting: YES- nausea  Dizziness: YES  Numbness: no  Weakness: no  Visual changes: no    History:   Head trauma: no  Family history of migraines: YES- brother  Previous tests for headaches: no  Neurologist evaluations: no  Able to do daily activities: no  Wake with a headaches: YES- sometimes  Do headaches wake you up: YES- some  Daily pain medication use: none  Work/school stressors/changes: no    Precipitating factors:   Does light make it worse: YES  Does sound make it worse: no    Alleviating factors:  Does sleep help: YES  Therapies Tried and outcome: Ibuprofen (Advil, Motrin), Naproxyn (Aleve), Tylenol; with no relief; Excedrin migraine works the best    Finished two courses of antibiotics for ear.     ROS:  ROS otherwise negative    OBJECTIVE:                                                    /80 (BP Location: Right arm, Cuff Size: Adult Large)  Pulse 120  Temp 98.9  F (37.2  C) (Oral)  Ht 5' 11\" (1.803 m)  Wt 183 lb 12.8 oz (83.4 kg)  SpO2 95%  BMI 25.63 kg/m2  Body mass index is 25.63 kg/(m^2).   GENERAL: alert, no distress  Eyes:  Eyes grossly normal to inspection, PERRL and conjunctivae and sclerae normal  HENT: ear canals- normal; TMs- erythemic and dull on right; wnl on left; Nose- normal; Mouth- no ulcers, no lesions  NECK: no tenderness, no adenopathy  RESP: lungs clear to auscultation - no rales, no rhonchi, no wheezes  CV: regular rates and rhythm, normal S1 S2, no S3 or S4 and no murmur, no click or " rub    Diagnostic test results:  No results found for this or any previous visit (from the past 24 hour(s)).     ASSESSMENT/PLAN:                                                    (G43.109) Migraine with aura and without status migrainosus, not intractable  (primary encounter diagnosis)  Comment: toradol in clinic today. He may begin imitrex at onset of next headache.   Plan: SUMAtriptan (IMITREX) 50 MG tablet, KETOROLAC         TROMETHAMINE 15MG, INJECTION INTRAMUSCULAR OR         SUB-Q            (H65.01) Right acute serous otitis media, recurrence not specified  Comment: hold off on antibiotics. If becomes symptomatic, call for antibiotics.   Plan:       Jazmín Hamilton PA-C  [facility]

## 2018-08-03 NOTE — LETTER
August 3, 2018      Cody Muñiz  [address]   [place] [address]        To Whom It May Concern:    Cody Muñiz  was seen on 8/3/18.  Please excuse him from 7/30-8/3 due to illness.        Sincerely,        Jazmín Hamilton PA-C

## 2018-08-03 NOTE — LETTER
August 3, 2018      Cody Muñiz  [address]   [place] [address]        To Whom It May Concern:    Cody Muñiz  was seen on 8/3/18.  Please excuse him from 7/30-8/3 due to illness. He may return to work with restrictions on 8/6/18.        Sincerely,        Jazmín Hamilton PA-C

## 2018-08-03 NOTE — PATIENT INSTRUCTIONS
Migraine Headache  What is a migraine headache?   A migraine headache is a specific kind of headache that can last for hours to days. It can cause intense pain as well as other symptoms. For example, you may feel sick to your stomach or have changes in your vision.   How does it occur?   The exact cause of migraines is not clear. Most experts think migraine attacks begin with abnormal activity in the brain. They may be related to a problem with the blood flow in your brain, or they may happen with changes in brain chemicals. Migraine headaches often are triggered by specific things. Common migraine triggers include:   stress   tiredness   changes in the weather   certain foods, such as wine, cheese, or chocolate   MSG or food preservatives, such as nitrates   red wine   some medicines   bright lights.   Migraines tend to run in families. They affect women 3 times more often than men. They often happen right before or during a woman's menstrual period. Or they may happen when a woman is taking birth control pills or hormone replacement pills.   What are the symptoms?   Before a migraine starts, there is often a warning period when you don't feel well. Some people have vision changes before their head starts hurting. They lose part of their vision or see bright spots or zigzag patterns in front of their eyes. These warning symptoms are called migraine aura. The vision changes of the aura usually go away as the headache begins.   Migraine symptoms may include:   throbbing or pounding headache   pain that gets worse with physical activity   extreme sensitivity to light, smells, and sounds   nausea   vomiting   The pain is usually more severe on one side of the head, but it can affect the whole head.   Sometimes a migraine can cause symptoms such as numbness or even weakness. However, these can also be symptoms of a stroke. If you have these other symptoms along with problems with your vision, do not assume  a migraine is the cause. Call your healthcare provider right away.   How is it diagnosed?   Your healthcare provider will ask about your symptoms and medical history and examine you. There are no lab tests or X-rays for diagnosing migraine headaches.   A careful history of your headaches is very helpful. Your healthcare provider may ask you to keep a headache diary in which you record the following:   date and time of each attack   how long the headache lasted   type of pain (for example, dull, sharp, throbbing, or a feeling of pressure)   location of pain   any symptoms before the headache began   foods and drinks you had before the headache began (This should include checking the ingredients in the product ingredient list of packaged foods you have eaten. Saving the labels of the foods or drinks might be a good way to record this information.)   use of cigarettes, caffeine, alcohol, or carbonated drinks before the headache began   time you went to bed and time you got up before the headache began   if you are a woman, the dates of your menstrual periods and use of birth control pills or other female hormones.   Depending on your headache symptoms and physical exam, your provider may recommend tests to check for other, more serious causes of your symptoms. For example, you may have a brain scan or magnetic resonance imaging (MRI).   How is it treated?   You may be able to stop mild migraine headaches by taking nonprescription pain-relief medicine when you start to have symptoms. Aspirin, acetaminophen, caffeine, ibuprofen, and naproxen have all been shown to be effective. You may find that any one of these medicines alone will treat your headache. Even just a caffeinated drink may help. However, some studies have shown combinations to be more effective and to work faster. Excedrin Migraine is an example of such a combination. It includes acetaminophen, aspirin, and caffeine. Other combination drugs, such as Midrin,  are available for mild to moderate headaches with a prescription from your healthcare provider.   Check with your healthcare provider before you give any medicine that contains aspirin or salicylates to a child or teen. This includes medicines like baby aspirin, some cold medicines, and Pepto Bismol. Children and teens who take aspirin are at risk for a serious illness called Reye's syndrome. Ibuprofen and naproxen are nonsteroidal anti-inflammatory medicines (NSAIDs). NSAIDs may cause stomach bleeding and other problems. These risks increase with age. Read the label and take as directed. Unless recommended by your healthcare provider, do not take NSAIDs for more than 10 days for any reason.   Other medicines your healthcare provider may prescribe to help keep headaches from getting severe once they start are:   A group of drugs called triptans, which are available as tablets (including some that may be taken without water), a shot, and a nasal spray. Examples of triptans are naratriptan, rizatriptan, sumatriptan, and zolmitriptan.   Ergot medicines such as dihydroergotamine (DHE) or ergotamine. These medicines are available in various forms, including pills you swallow or put under your tongue, nasal spray, rectal suppositories and shots.   It's best to take these medicines as soon as possible after a headache begins. This means you need to recognize the warning symptoms.   If you have frequent migraines (3 or more a month), you may need to take other medicine every day to prevent severe and frequent headaches. Examples of drugs your provider may prescribe for this purpose are:   antiseizure medicines (divalproex sodium/valproate, gabapentin, or topiramate)   antidepressants (tricyclics, such as amitriptyline, nortriptyline, or doxepin)   some beta blockers (such as atenolol, metoprolol, nadolol, nebivolol, propranolol, or timolol)   some calcium channel blockers (such as verapamil).   Women who have migraines  triggered by their menstrual cycle may take preventive medicines for a few days around their period. Medicines that may be recommended are NSAIDs, triptans, and ergots. If these medicines do not work, hormone (estrogen) therapy may be helpful. Hormone therapy may also be helpful for women who have migraines during or after menopause. However, there is an increased risk of stroke for women with migraines who use birth control products (contraceptives) that contain estrogen.   You may not know if a medicine works for you until you have tried it for several weeks.   If you are planning to get pregnant, be sure to talk to your healthcare provider about whether the medicines you have been prescribed are safe during pregnancy. If they are not known to be safe, you will need a different treatment plan while you are pregnant and breastfeeding.   How long will the effects last?   The headache may last from a few hours to a few days. You may tend to get migraines for the rest of your life. However, many people find that they have migraines less often as they get older.   How can I take care of myself?   When you have a migraine:   As soon as possible after the symptoms start, take the medicine recommended or prescribed by your healthcare provider.   If you can, rest in a quiet room until the symptoms are gone. The pain may go away with sleep.   Put a cool, moist washcloth on the painful side of your head. You might also try a heating pad set on the lowest setting.   Don't drive a car while you have the headache.   You can make your migraines easier to take care of. Learn to become aware of your early warning signs of headache. You will need to pay close attention to your body to be aware of these signs. When the warning signs appear, try going to a quieter place and doing relaxation exercises. This early care can make a big difference in how easily you can get over the migraine.   If your symptoms don't get better when you  "take medicine, make another appointment with your healthcare provider. It may take several visits to find the best way to control your headaches. Also, if you are having headaches more often, make a follow-up appointment with your provider to see if you need more testing or preventive medicine.   Call your healthcare provider right away if:   Changes in your vision do not go away.   You have symptoms that are not usually part of your migraines, such as:   trouble talking or slurred speech   arm or leg weakness   You have other symptoms such as:   fever   stiff neck   repeated vomiting for several hours   numbness or tingling in your face, arms, or legs   You are pregnant and your headache is particularly bad or it seems different from your usual migraines, particularly in the last half of pregnancy. This is especially important if you have problems with your vision such as flashing lights, difficulty focusing or blurriness, any nausea or vomiting, or weakness in any part of your body. These may be signs of a developing pregnancy problem that needs immediate attention.   How can I help prevent migraine headaches?   Prevention is an important part of treatment. To help prevent migraine headaches:   You may need to take medicine prescribed by your healthcare provider.   You may need to avoid certain foods or activities suggested by your headache diary as possible triggers of headaches. Common food triggers are:   citrus fruit   chocolate   aged cheese and other preserved or aged foods containing tyramine, including leftovers held for more than 1 or 2 days at refrigerator temperature   sodium nitrate (found, for example, in food coloring, preservatives, processed meats and fish, hot dogs, and luncheon meats)   monosodium glutamate \"MSG\" (found, for example, in Chinese food, pepperoni, and many processed foods)   red wine and beer   the artificial sweetener aspartame   Ask your provider about avoiding medicines that may " trigger headaches.   If you are taking birth control pills or other female hormones, ask your provider if you should stop taking them.   If you smoke, stop. If someone else in your household smokes, ask them to smoke outside. Cigarette smoke can make your symptoms worse.   Eat healthy meals at about the same time each day. Don't skip meals, especially breakfast.   Get regular rest and exercise.   Try to reduce stress. Relaxation exercises and biofeedback may help you manage stress.   Limit alcohol to no more than 1 drink a day for women and two drinks a day for men.

## 2018-08-03 NOTE — NURSING NOTE
The following medication was given:     MEDICATION: Toradol 60 mg  ROUTE: IM  SITE: Ventrogluteal - Right  DOSE: 60mg  LOT #: [id number]  :  [employer]  EXPIRATION DATE:  08/19  NDC#: 33628-185-20  Jeffrey Posey CMA

## 2018-08-03 NOTE — MR AVS SNAPSHOT
After Visit Summary   8/3/2018    Cody Muñiz    MRN: [id number]           Patient Information     Date Of Birth          1990        Visit Information        Provider Department      8/3/2018 11:30 AM Jazmín Hamilton PA-C [facility]        Today's Diagnoses     Migraine with aura and without status migrainosus, not intractable    -  1      Care Instructions                  Migraine Headache  What is a migraine headache?   A migraine headache is a specific kind of headache that can last for hours to days. It can cause intense pain as well as other symptoms. For example, you may feel sick to your stomach or have changes in your vision.   How does it occur?   The exact cause of migraines is not clear. Most experts think migraine attacks begin with abnormal activity in the brain. They may be related to a problem with the blood flow in your brain, or they may happen with changes in brain chemicals. Migraine headaches often are triggered by specific things. Common migraine triggers include:   stress   tiredness   changes in the weather   certain foods, such as wine, cheese, or chocolate   MSG or food preservatives, such as nitrates   red wine   some medicines   bright lights.   Migraines tend to run in families. They affect women 3 times more often than men. They often happen right before or during a woman's menstrual period. Or they may happen when a woman is taking birth control pills or hormone replacement pills.   What are the symptoms?   Before a migraine starts, there is often a warning period when you don't feel well. Some people have vision changes before their head starts hurting. They lose part of their vision or see bright spots or zigzag patterns in front of their eyes. These warning symptoms are called migraine aura. The vision changes of the aura usually go away as the headache begins.   Migraine symptoms may include:   throbbing or pounding headache   pain  that gets worse with physical activity   extreme sensitivity to light, smells, and sounds   nausea   vomiting   The pain is usually more severe on one side of the head, but it can affect the whole head.   Sometimes a migraine can cause symptoms such as numbness or even weakness. However, these can also be symptoms of a stroke. If you have these other symptoms along with problems with your vision, do not assume a migraine is the cause. Call your healthcare provider right away.   How is it diagnosed?   Your healthcare provider will ask about your symptoms and medical history and examine you. There are no lab tests or X-rays for diagnosing migraine headaches.   A careful history of your headaches is very helpful. Your healthcare provider may ask you to keep a headache diary in which you record the following:   date and time of each attack   how long the headache lasted   type of pain (for example, dull, sharp, throbbing, or a feeling of pressure)   location of pain   any symptoms before the headache began   foods and drinks you had before the headache began (This should include checking the ingredients in the product ingredient list of packaged foods you have eaten. Saving the labels of the foods or drinks might be a good way to record this information.)   use of cigarettes, caffeine, alcohol, or carbonated drinks before the headache began   time you went to bed and time you got up before the headache began   if you are a woman, the dates of your menstrual periods and use of birth control pills or other female hormones.   Depending on your headache symptoms and physical exam, your provider may recommend tests to check for other, more serious causes of your symptoms. For example, you may have a brain scan or magnetic resonance imaging (MRI).   How is it treated?   You may be able to stop mild migraine headaches by taking nonprescription pain-relief medicine when you start to have symptoms. Aspirin, acetaminophen,  caffeine, ibuprofen, and naproxen have all been shown to be effective. You may find that any one of these medicines alone will treat your headache. Even just a caffeinated drink may help. However, some studies have shown combinations to be more effective and to work faster. Excedrin Migraine is an example of such a combination. It includes acetaminophen, aspirin, and caffeine. Other combination drugs, such as Midrin, are available for mild to moderate headaches with a prescription from your healthcare provider.   Check with your healthcare provider before you give any medicine that contains aspirin or salicylates to a child or teen. This includes medicines like baby aspirin, some cold medicines, and Pepto Bismol. Children and teens who take aspirin are at risk for a serious illness called Reye's syndrome. Ibuprofen and naproxen are nonsteroidal anti-inflammatory medicines (NSAIDs). NSAIDs may cause stomach bleeding and other problems. These risks increase with age. Read the label and take as directed. Unless recommended by your healthcare provider, do not take NSAIDs for more than 10 days for any reason.   Other medicines your healthcare provider may prescribe to help keep headaches from getting severe once they start are:   A group of drugs called triptans, which are available as tablets (including some that may be taken without water), a shot, and a nasal spray. Examples of triptans are naratriptan, rizatriptan, sumatriptan, and zolmitriptan.   Ergot medicines such as dihydroergotamine (DHE) or ergotamine. These medicines are available in various forms, including pills you swallow or put under your tongue, nasal spray, rectal suppositories and shots.   It's best to take these medicines as soon as possible after a headache begins. This means you need to recognize the warning symptoms.   If you have frequent migraines (3 or more a month), you may need to take other medicine every day to prevent severe and frequent  headaches. Examples of drugs your provider may prescribe for this purpose are:   antiseizure medicines (divalproex sodium/valproate, gabapentin, or topiramate)   antidepressants (tricyclics, such as amitriptyline, nortriptyline, or doxepin)   some beta blockers (such as atenolol, metoprolol, nadolol, nebivolol, propranolol, or timolol)   some calcium channel blockers (such as verapamil).   Women who have migraines triggered by their menstrual cycle may take preventive medicines for a few days around their period. Medicines that may be recommended are NSAIDs, triptans, and ergots. If these medicines do not work, hormone (estrogen) therapy may be helpful. Hormone therapy may also be helpful for women who have migraines during or after menopause. However, there is an increased risk of stroke for women with migraines who use birth control products (contraceptives) that contain estrogen.   You may not know if a medicine works for you until you have tried it for several weeks.   If you are planning to get pregnant, be sure to talk to your healthcare provider about whether the medicines you have been prescribed are safe during pregnancy. If they are not known to be safe, you will need a different treatment plan while you are pregnant and breastfeeding.   How long will the effects last?   The headache may last from a few hours to a few days. You may tend to get migraines for the rest of your life. However, many people find that they have migraines less often as they get older.   How can I take care of myself?   When you have a migraine:   As soon as possible after the symptoms start, take the medicine recommended or prescribed by your healthcare provider.   If you can, rest in a quiet room until the symptoms are gone. The pain may go away with sleep.   Put a cool, moist washcloth on the painful side of your head. You might also try a heating pad set on the lowest setting.   Don't drive a car while you have the headache.    You can make your migraines easier to take care of. Learn to become aware of your early warning signs of headache. You will need to pay close attention to your body to be aware of these signs. When the warning signs appear, try going to a quieter place and doing relaxation exercises. This early care can make a big difference in how easily you can get over the migraine.   If your symptoms don't get better when you take medicine, make another appointment with your healthcare provider. It may take several visits to find the best way to control your headaches. Also, if you are having headaches more often, make a follow-up appointment with your provider to see if you need more testing or preventive medicine.   Call your healthcare provider right away if:   Changes in your vision do not go away.   You have symptoms that are not usually part of your migraines, such as:   trouble talking or slurred speech   arm or leg weakness   You have other symptoms such as:   fever   stiff neck   repeated vomiting for several hours   numbness or tingling in your face, arms, or legs   You are pregnant and your headache is particularly bad or it seems different from your usual migraines, particularly in the last half of pregnancy. This is especially important if you have problems with your vision such as flashing lights, difficulty focusing or blurriness, any nausea or vomiting, or weakness in any part of your body. These may be signs of a developing pregnancy problem that needs immediate attention.   How can I help prevent migraine headaches?   Prevention is an important part of treatment. To help prevent migraine headaches:   You may need to take medicine prescribed by your healthcare provider.   You may need to avoid certain foods or activities suggested by your headache diary as possible triggers of headaches. Common food triggers are:   citrus fruit   chocolate   aged cheese and other preserved or aged foods containing tyramine,  "including leftovers held for more than 1 or 2 days at refrigerator temperature   sodium nitrate (found, for example, in food coloring, preservatives, processed meats and fish, hot dogs, and luncheon meats)   monosodium glutamate \"MSG\" (found, for example, in Chinese food, pepperoni, and many processed foods)   red wine and beer   the artificial sweetener aspartame   Ask your provider about avoiding medicines that may trigger headaches.   If you are taking birth control pills or other female hormones, ask your provider if you should stop taking them.   If you smoke, stop. If someone else in your household smokes, ask them to smoke outside. Cigarette smoke can make your symptoms worse.   Eat healthy meals at about the same time each day. Don't skip meals, especially breakfast.   Get regular rest and exercise.   Try to reduce stress. Relaxation exercises and biofeedback may help you manage stress.   Limit alcohol to no more than 1 drink a day for women and two drinks a day for men.                 Follow-ups after your visit        Who to contact     If you have questions or need follow up information about today's clinic visit or your schedule please contact [facility] directly at [phone].  Normal or non-critical lab and imaging results will be communicated to you by MyChart, letter or phone within 4 business days after the clinic has received the results. If you do not hear from us within 7 days, please contact the clinic through MyChart or phone. If you have a critical or abnormal lab result, we will notify you by phone as soon as possible.  Submit refill requests through [employer] or call your pharmacy and they will forward the refill request to us. Please allow 3 business days for your refill to be completed.          Additional Information About Your Visit        Care EveryWhere ID     This is your Care EveryWhere ID. This could be used by other organizations to access your [facility] medical " "records  [id number]        Your Vitals Were     Pulse Temperature Height Pulse Oximetry BMI (Body Mass Index)       120 98.9  F (37.2  C) (Oral) 5' 11\" (1.803 m) 95% 25.63 kg/m2        Blood Pressure from Last 3 Encounters:   08/03/18 116/80   07/20/18 (!) 146/100   07/17/18 138/88    Weight from Last 3 Encounters:   08/03/18 183 lb 12.8 oz (83.4 kg)   07/20/18 188 lb (85.3 kg)   07/17/18 189 lb 12.8 oz (86.1 kg)              Today, you had the following     No orders found for display         Today's Medication Changes          These changes are accurate as of 8/3/18 11:57 AM.  If you have any questions, ask your nurse or doctor.               Start taking these medicines.        Dose/Directions    SUMAtriptan 50 MG tablet   Commonly known as:  IMITREX   Used for:  Migraine with aura and without status migrainosus, not intractable   Started by:  Jazmín Hamilton PA-C        Dose:  50 mg   Take 1 tablet (50 mg) by mouth at onset of headache for migraine May repeat in 2 hours. Max 4 tablets/24 hours.   Quantity:  9 tablet   Refills:  0            Where to get your medicines      These medications were sent to [facility] - [address]   [address], [place] [address]     Phone:  [phone]     SUMAtriptan 50 MG tablet                Primary Care Provider Office Phone # Fax #    Alexis Garrison [phone]       [address] [place] [address] [address] [place] [address]        Equal Access to Services     [facility] AH: Hadcash Payne, hayley mason, qaybisrael kaalmegan reis. So [facility] [place] [phone].    ATENCIÓN: Si habla español, tiene a rivera disposición servicios gratuitos de asistencia lingüística. Llame al [phone].    We comply with applicable federal civil rights laws and [place] laws. We do not discriminate on the basis of race, color, national origin, age, disability, " sex, sexual orientation, or gender identity.            Thank you!     Thank you for choosing [facility] [place]  for your care. Our goal is always to provide you with excellent care. Hearing back from our patients is one way we can continue to improve our services. Please take a few minutes to complete the written survey that you may receive in the mail after your visit with us. Thank you!             Your Updated Medication List - Protect others around you: Learn how to safely use, store and throw away your medicines at www.disposemymeds.org.          This list is accurate as of 8/3/18 11:57 AM.  Always use your most recent med list.                   Brand Name Dispense Instructions for use Diagnosis    cyclobenzaprine 10 MG tablet    FLEXERIL    15 tablet    Take 1 tablet (10 mg) by mouth 3 times daily as needed for muscle spasms    Acute midline low back pain with right-sided sciatica       fluticasone 50 MCG/ACT spray    FLONASE    1 Bottle    Spray 2 sprays into both nostrils daily    Middle ear effusion, bilateral       SUMAtriptan 50 MG tablet    IMITREX    9 tablet    Take 1 tablet (50 mg) by mouth at onset of headache for migraine May repeat in 2 hours. Max 4 tablets/24 hours.    Migraine with aura and without status migrainosus, not intractable

## 2018-08-06 ENCOUNTER — TELEPHONE (OUTPATIENT)
Dept: PEDIATRICS | Facility: CLINIC | Age: 28
End: 2018-08-06

## 2018-08-06 NOTE — TELEPHONE ENCOUNTER
Contacted patient that letter was ready. He would like it put at the . Walked to the first floor.  Etelvina Land RN

## 2018-08-06 NOTE — TELEPHONE ENCOUNTER
Reason for call:  Other   Patient called regarding (reason for call): letter  Additional comments: Patient states that he continues to experience Migraines. He would like an note excusing him from work for today. Patient states that the note should indicate that he can return to work tomorrow.     Phone number to reach patient:  Home number on file [phone] (home)    Best Time:  any    Can we leave a detailed message on this number?  NO

## 2018-08-06 NOTE — LETTER
August 6, 2018        Cody Muñiz  [address]   [place] [address]        To Whom It May Concern,      Cody Muñiz is under my professional care for his health care needs. He was seen in clinic on August 3, 2018 for an illness. Please excuse him from work today August 6, 2018. He may return to work tomorrow August 7, 2018.     If you have any questions or concerns regarding this letter please contact me at the clinic.      Sincerely,          Alexis Garrison MD

## 2018-08-14 ENCOUNTER — TELEPHONE (OUTPATIENT)
Dept: PEDIATRICS | Facility: CLINIC | Age: 28
End: 2018-08-14

## 2018-08-14 DIAGNOSIS — H65.04 RECURRENT ACUTE SEROUS OTITIS MEDIA OF RIGHT EAR: Primary | ICD-10-CM

## 2018-08-14 RX ORDER — CLINDAMYCIN HCL 300 MG
300 CAPSULE ORAL 3 TIMES DAILY
Qty: 30 CAPSULE | Refills: 0 | Status: SHIPPED | OUTPATIENT
Start: 2018-08-14 | End: 2018-09-20

## 2018-08-14 NOTE — LETTER
August 14, 2018      Cody Muñiz  [address]   [place] [address]        To Whom It May Concern:    Cody Muñiz  was seen on 8/3/18.  Please excuse him from 7/30-8/3 due to illness. Also, from 8/13-8/14 due to illness. He may return to work on 8/15 without restrictions.        Sincerely,        Jazmín Hamilton PA-C

## 2018-08-14 NOTE — LETTER
August 14, 2018      Cody Muñiz  [address]   [place] [facility] [address]        To Whom It May Concern:    Cody Muñiz may return to work on 8/15/18 without restrictions.         Sincerely,        Jazmín Hamilton PA-C

## 2018-08-14 NOTE — LETTER
August 14, 2018      Cody Muñiz  [address]   [place] [address]      To Whom It May Concern,      Cody Muñiz was initially  treated on Aug 3 and treated again Aug 14, 2018. Could you please excuse him from work July 30, 2018 through Aug 14, 2018. He is able to return to work Aug 15, 2018 with no restrictions.        Sincerely,          Jazmín Hamilton PA-C

## 2018-08-14 NOTE — TELEPHONE ENCOUNTER
Patient is having continued symptoms of ear infection, would like to discuss if possible. Please call back.  -Katarina Brower

## 2018-08-14 NOTE — TELEPHONE ENCOUNTER
Discussed symptoms with patient. He was seen last week for migraine and AOM-right.     His migraine symptoms have resolved.     He was treated for AOM in June with augmentin and in July with cefdinir. Feels that sinus/ear symptoms have never fully resolved.    Proceed with third course of antibiotics-patient to take with food.     If symptoms persist, will refer to ENT.     Patient in agreement with plan.     Jazmín Hamilton PA-C

## 2018-08-15 ENCOUNTER — TELEPHONE (OUTPATIENT)
Dept: PEDIATRICS | Facility: CLINIC | Age: 28
End: 2018-08-15

## 2018-08-15 NOTE — TELEPHONE ENCOUNTER
Patient fell Saturday afternoon.    States he has had some sinus congestion and feels this has caused him to be off balance.  Was walking in his apt and tripped, striking the corner of the counter and the door.  Sustained a small laceration below left eye.  This has scabbed over-patient reports it was too small to need suturing.  Ecchymosis below eye, with swelling below eye.  No known LOC.  No nausea or vomiting.    Since the fall has had persistent dull headache, and dizziness that comes and goes.  Noted dizziness upon arising this am.  No memory lapses.  Periphery of vision is slightly blurred, but unsure if this is from the swelling.    Discussed with Allison and she recommends that patient follow-up with Dr. Garrison on Friday.  She spoke with patient on the phone yesterday  Patient notified of this and appointment was scheduled.  Advised if he develops a severe headache, projectile vomiting, confusion, needs to go to the ER for evaluation.  Patient in agreement.  Patient is unsure if he will be able to work.  Will discuss need for work note with Dr. Garrison on Friday.  No further questions.  Will call back if any other questions or concerns.  LEONARD Kinney RN

## 2018-08-17 ENCOUNTER — OFFICE VISIT (OUTPATIENT)
Dept: PEDIATRICS | Facility: CLINIC | Age: 28
End: 2018-08-17
Payer: COMMERCIAL

## 2018-08-17 VITALS
DIASTOLIC BLOOD PRESSURE: 80 MMHG | HEART RATE: 79 BPM | TEMPERATURE: 98.8 F | OXYGEN SATURATION: 98 % | HEIGHT: 71 IN | BODY MASS INDEX: 24.99 KG/M2 | SYSTOLIC BLOOD PRESSURE: 100 MMHG | WEIGHT: 178.5 LBS

## 2018-08-17 DIAGNOSIS — G43.809 OTHER MIGRAINE WITHOUT STATUS MIGRAINOSUS, NOT INTRACTABLE: ICD-10-CM

## 2018-08-17 DIAGNOSIS — J01.01 ACUTE RECURRENT MAXILLARY SINUSITIS: Primary | ICD-10-CM

## 2018-08-17 PROCEDURE — 99214 OFFICE O/P EST MOD 30 MIN: CPT | Performed by: INTERNAL MEDICINE

## 2018-08-17 NOTE — MR AVS SNAPSHOT
"              After Visit Summary   8/17/2018    Cody Muñiz    MRN: [id number]           Patient Information     Date Of Birth          1990        Visit Information        Provider Department      8/17/2018 3:20 PM Alexis Garrison MD [facility]        Today's Diagnoses     Acute recurrent maxillary sinusitis    -  1    Other migraine without status migrainosus, not intractable           Follow-ups after your visit        Follow-up notes from your care team     Return in about 3 months (around 11/17/2018) for Physical Exam.      Who to contact     If you have questions or need follow up information about today's clinic visit or your schedule please contact [facility] directly at [phone].  Normal or non-critical lab and imaging results will be communicated to you by MyChart, letter or phone within 4 business days after the clinic has received the results. If you do not hear from us within 7 days, please contact the clinic through MyChart or phone. If you have a critical or abnormal lab result, we will notify you by phone as soon as possible.  Submit refill requests through [employer] or call your pharmacy and they will forward the refill request to us. Please allow 3 business days for your refill to be completed.          Additional Information About Your Visit        Care EveryWhere ID     This is your Care EveryWhere ID. This could be used by other organizations to access your [place] medical records  [id number]        Your Vitals Were     Pulse Temperature Height Pulse Oximetry BMI (Body Mass Index)       79 98.8  F (37.1  C) (Oral) 5' 11\" (1.803 m) 98% 24.9 kg/m2        Blood Pressure from Last 3 Encounters:   08/17/18 100/80   08/03/18 116/80   07/20/18 (!) 146/100    Weight from Last 3 Encounters:   08/17/18 178 lb 8 oz (81 kg)   08/03/18 183 lb 12.8 oz (83.4 kg)   07/20/18 188 lb (85.3 kg)              Today, you had the following     No orders found for display       " Primary Care Provider Office Phone # Fax #    Alexis Garrison [phone] [address] [place] [address]        Equal Access to Services     AIDEN MAYRA : Christal pauly velasco sai Payne, waaxda luqadaha, qaybta kaalmada toan, megan verma layiminydia joaquin. So [facility] [phone].    ATENCIÓN: Si habla español, tiene a rivera disposición servicios gratuitos de asistencia lingüística. Llame al [phone].    We comply with applicable federal civil rights laws and [place] laws. We do not discriminate on the basis of race, color, national origin, age, disability, sex, sexual orientation, or gender identity.            Thank you!     Thank you for choosing [facility]  for your care. Our goal is always to provide you with excellent care. Hearing back from our patients is one way we can continue to improve our services. Please take a few minutes to complete the written survey that you may receive in the mail after your visit with us. Thank you!             Your Updated Medication List - Protect others around you: Learn how to safely use, store and throw away your medicines at www.disposemymeds.org.          This list is accurate as of 8/17/18  4:16 PM.  Always use your most recent med list.                   Brand Name Dispense Instructions for use Diagnosis    clindamycin 300 MG capsule    CLEOCIN    30 capsule    Take 1 capsule (300 mg) by mouth 3 times daily    Recurrent acute serous otitis media of right ear       cyclobenzaprine 10 MG tablet    FLEXERIL    15 tablet    Take 1 tablet (10 mg) by mouth 3 times daily as needed for muscle spasms    Acute midline low back pain with right-sided sciatica       fluticasone 50 MCG/ACT spray    FLONASE    1 Bottle    Spray 2 sprays into both nostrils daily    Middle ear effusion, bilateral       SUMAtriptan 50 MG tablet    IMITREX    9 tablet    Take 1 tablet (50 mg) by mouth at onset of headache for migraine May  repeat in 2 hours. Max 4 tablets/24 hours.    Migraine with aura and without status migrainosus, not intractable

## 2018-08-17 NOTE — LETTER
August 17, 2018      Cody Muñiz  [address]   [place] [address]        To Whom It May Concern:    Cody Muñiz was seen in our clinic. He was off work from 8/6 until 8/17/18.  He may return to work without restrictions on Monday, August 20th.       Sincerely,        Alexis Garrison MD

## 2018-08-20 ENCOUNTER — TELEPHONE (OUTPATIENT)
Dept: PEDIATRICS | Facility: CLINIC | Age: 28
End: 2018-08-20

## 2018-08-20 PROBLEM — G43.809 OTHER MIGRAINE WITHOUT STATUS MIGRAINOSUS, NOT INTRACTABLE: Status: ACTIVE | Noted: 2018-08-20

## 2018-08-20 NOTE — LETTER
August 20, 2018      Cody Muñiz  [address]   [place] [address]        To Whom It May Concern:    Cody Muñiz was seen at the clinic on  08/17/2018.  Please excuse him from 08/06/2018-8/17/2018.  He is clear to returned to work on Monday August 20, 2018 with no work restrictions.     Sincerely,        Alexis Garrison MD

## 2018-08-20 NOTE — TELEPHONE ENCOUNTER
Walked letter downstairs to first floor  for patient to . Called and lm to notify ready for .     Gretel Prescott MA 3:47 PM 8/20/2018

## 2018-08-20 NOTE — LETTER
August 20, 2018        Cody Muñiz  [address]   [place] [address]          To Whom It May Concern,    Cody Muñiz was seen in our clinic on 8/17/18. Please excuse him from work from 8/6/18 to 8/17/18 to return to work without restrictions on 8/20/18.        Sincerely,        Alexis Garrison MD

## 2018-08-20 NOTE — TELEPHONE ENCOUNTER
"Reason for call:  Other   Patient called regarding (reason for call): letter  Additional comments: Patient states that his return to work note was rejected and needs to be revised. He states that it needs to say   Cody Muñiz was seen at the clinic on  08/17/2018.  Please excuse him from 08/06/2018-8/17/2018.  He is clear to returned to work on Monday August 20, 2018 with no work restrictions.     He states that if it does not say \"excuse\" from 8/6-8/17 and include the date he was seen it will be rejected.      Patient is requesting to  the letter today.     Phone number to reach patient:  Home number on file [phone] (home)    Best Time:  any    Can we leave a detailed message on this number?  NO  "

## 2018-09-17 ENCOUNTER — TELEPHONE (OUTPATIENT)
Dept: PEDIATRICS | Facility: CLINIC | Age: 28
End: 2018-09-17

## 2018-09-17 NOTE — TELEPHONE ENCOUNTER
Reason for Call:  Form, our goal is to have forms completed with 72 hours, however, some forms may require a visit or additional information.    Type of letter, form or note:  disability    Who is the form from?: [employer] (if other please explain)    Where did the form come from: form was faxed in    What clinic location was the form placed at?: [place]    What number is listed as a contact on the form?: [employer] calling to speak with nurse about the Attending Physician Statement form that was faxed to our office today. She would like the nurse to call them at [phone] reference claim number [id number]. Need to discuss the diagnosis and work restrictions, etc.       Additional comments:     Call taken on 9/17/2018 at 10:12 AM by Josephine Hughes

## 2018-09-20 ENCOUNTER — OFFICE VISIT (OUTPATIENT)
Dept: PEDIATRICS | Facility: CLINIC | Age: 28
End: 2018-09-20
Payer: COMMERCIAL

## 2018-09-20 VITALS
HEART RATE: 100 BPM | BODY MASS INDEX: 25.2 KG/M2 | DIASTOLIC BLOOD PRESSURE: 92 MMHG | HEIGHT: 71 IN | SYSTOLIC BLOOD PRESSURE: 116 MMHG | WEIGHT: 180 LBS | OXYGEN SATURATION: 99 % | TEMPERATURE: 98.3 F

## 2018-09-20 DIAGNOSIS — J01.01 ACUTE RECURRENT MAXILLARY SINUSITIS: Primary | ICD-10-CM

## 2018-09-20 PROCEDURE — 99214 OFFICE O/P EST MOD 30 MIN: CPT | Performed by: INTERNAL MEDICINE

## 2018-09-20 NOTE — LETTER
September 20, 2018      Cody Muñiz  [address]   [place] [address]        To Whom It May Concern:    Cody Muñiz was seen in our clinic. He may return to work without restrictions on 9/21/18.  Please excuse him work from 9/6/18 until 9/20/18 due to another sinus infection with headache and fever.       Sincerely,        Alexis Garrison MD

## 2018-09-20 NOTE — PROGRESS NOTES
"  SUBJECTIVE:   Cody Muñiz is a 28 year old male who presents to clinic today for the following health issues:      Pt here to follow up on sinusitis. Symptoms returned 9/6/18, went to [facility] where he was started on Flonase and Prednisone.     Symptoms include sinus pressure, radiates to behind ears. Drainage has been clear. Denies sneezing, itchy eyes.     Had been completely better.  Then on 9/6 began to feel some headache, dizziness on wakening.  Persisted throughout weekend.  Notes that his ears \"kill him\", and this has been most consistent.  Feels some pressure behind sinuses.      Today woke up and feels much better.  But as late as last night, was having pressure in both ears.  Feels like he has light headache on either side.      No fevers, but did have some cold sweats and chills.     Has had some nausea as well, with some vomiting.  Last vomiting was 1 week ago.     Has missed work since 9/6/18.   But today is the first day he came in.           Problem list and histories reviewed & adjusted, as indicated.  Additional history: as documented    Patient Active Problem List   Diagnosis     Generalized anxiety disorder     ADHD (attention deficit hyperactivity disorder)     Lumbago     Other migraine without status migrainosus, not intractable     History reviewed. No pertinent surgical history.    Social History   Substance Use Topics     Smoking status: Former Smoker     Quit date: 11/21/2009     Smokeless tobacco: Never Used     Alcohol use Yes      Comment: drinks a couple times a week 3 drinks     Family History   Problem Relation Age of Onset     Cancer Mother      lung     Cancer - colorectal Maternal Grandfather      Prostate Cancer Paternal Grandfather          Current Outpatient Prescriptions   Medication Sig Dispense Refill     fluticasone (FLONASE) 50 MCG/ACT spray Spray 2 sprays into both nostrils daily 1 Bottle 0     No Known Allergies  BP Readings from Last 3 Encounters:   09/20/18 " "(!) 116/92   08/17/18 100/80   08/03/18 116/80    Wt Readings from Last 3 Encounters:   09/20/18 180 lb (81.6 kg)   08/17/18 178 lb 8 oz (81 kg)   08/03/18 183 lb 12.8 oz (83.4 kg)                  Labs reviewed in EPIC    Reviewed and updated as needed this visit by clinical staff       Reviewed and updated as needed this visit by Provider         ROS:  CONSTITUTIONAL: NEGATIVE for fever, chills, change in weight  ENT/MOUTH: NEGATIVE for ear, mouth and throat problems  RESP: NEGATIVE for significant cough or SOB  CV: NEGATIVE for chest pain, palpitations or peripheral edema    OBJECTIVE:                                                    BP (!) 116/92 (BP Location: Right arm, Cuff Size: Adult Regular)  Pulse 100  Temp 98.3  F (36.8  C) (Oral)  Ht 5' 11\" (1.803 m)  Wt 180 lb (81.6 kg)  SpO2 99%  BMI 25.1 kg/m2  Body mass index is 25.1 kg/(m^2).   GENERAL: healthy, alert, well nourished, well hydrated, no distress  HENT: ear canals- normal; TMs- normal; Nose- normal; Mouth- no ulcers, no lesions  NECK: no tenderness, no adenopathy, no asymmetry, no masses, no stiffness; thyroid- normal to palpation  RESP: lungs clear to auscultation - no rales, no rhonchi, no wheezes  CV: regular rates and rhythm, normal S1 S2, no S3 or S4 and no murmur, no click or rub -  ABDOMEN: soft, no tenderness, no  hepatosplenomegaly, no masses, normal bowel sounds    Diagnostic test results:  Diagnostic Test Results:  none      ASSESSMENT/PLAN:                                                    1. Acute recurrent maxillary sinusitis  This began on September 6, but he has not come in to see anybody at our clinic until today.  He does state that he came into the urgent care on 917 and he treated him with allergy medicine such as Flonase.  I recommended that he continue his Flonase and add an antihistamine such as Claritin or Allegra.  He is experiencing no significant ear plugging or sinus pain today, although says last night his ears " are very full and he had sinus pressure.  Owing to the chronicity and recurrence of the symptoms I will have him see ear nose and throat and also perform a CT scan of his brain and sinuses given the nausea headaches dizziness and nasal pressure.  He is asking for a off work note, which I feel is becoming dangerously excessive in his case.  I told him that I would excuse him for the time that he took off from the sixth until today, but that he needs to return to work tomorrow (9/21).  He has no significant symptoms today that would merit antibiotics.  Today, he feels better.   - CT Head w/o Contrast; Future  - CT Facial Bones without Contrast; Future  - OTOLARYNGOLOGY REFERRAL    E4: Spent 25 minutes face to face.     More than 50% of the time was spent in counseling and coordination of care of these issues.      See Patient Instructions    Alexis Garrison MD  [facility]

## 2018-09-20 NOTE — PATIENT INSTRUCTIONS
They will call you for a CT of brain and sinuses.    You need to follow up with otolaryngologist for an evaluation of why you are having such significant symptoms.    Follow up with me after you see otolaryngologist.    Alexis Garrison MD  Internal Medicine and Pediatrics

## 2018-09-20 NOTE — MR AVS SNAPSHOT
After Visit Summary   9/20/2018    Cody Muñiz    MRN: [id number]           Patient Information     Date Of Birth          1990        Visit Information        Provider Department      9/20/2018 7:40 AM Alexis Garrison MD [facility] [place]        Today's Diagnoses     Acute recurrent maxillary sinusitis    -  1      Care Instructions    They will call you for a CT of brain and sinuses.    You need to follow up with otolaryngologist for an evaluation of why you are having such significant symptoms.    Follow up with me after you see otolaryngologist.    Alexis Garrison MD  Internal Medicine and Pediatrics             Follow-ups after your visit        Additional Services     OTOLARYNGOLOGY REFERRAL       Your provider has referred you to: [facility]: [facility] [place] [phone]   [email or web address]    Please be aware that coverage of these services is subject to the terms and limitations of your health insurance plan.  Call member services at your health plan with any benefit or coverage questions.      Please bring the following with you to your appointment:    (1) Any X-Rays, CTs or MRIs which have been performed.  Contact the facility where they were done to arrange for  prior to your scheduled appointment.   (2) List of current medications  (3) This referral request   (4) Any documents/labs given to you for this referral                  Future tests that were ordered for you today     Open Future Orders        Priority Expected Expires Ordered    CT Head w/o Contrast Routine  9/20/2019 9/20/2018    CT Facial Bones without Contrast Routine  9/20/2019 9/20/2018            Who to contact     If you have questions or need follow up information about today's clinic visit or your schedule please contact [facility] [place] directly at [phone].  Normal or non-critical lab and imaging results will be communicated to you by MyChart, letter or phone within  "4 business days after the clinic has received the results. If you do not hear from us within 7 days, please contact the clinic through [employer] or phone. If you have a critical or abnormal lab result, we will notify you by phone as soon as possible.  Submit refill requests through [employer] or call your pharmacy and they will forward the refill request to us. Please allow 3 business days for your refill to be completed.          Additional Information About Your Visit        Care EveryWhere ID     This is your Care EveryWhere ID. This could be used by other organizations to access your [place] medical records  [id number]        Your Vitals Were     Pulse Temperature Height Pulse Oximetry BMI (Body Mass Index)       100 98.3  F (36.8  C) (Oral) 5' 11\" (1.803 m) 99% 25.1 kg/m2        Blood Pressure from Last 3 Encounters:   09/20/18 (!) 116/92   08/17/18 100/80   08/03/18 116/80    Weight from Last 3 Encounters:   09/20/18 180 lb (81.6 kg)   08/17/18 178 lb 8 oz (81 kg)   08/03/18 183 lb 12.8 oz (83.4 kg)              We Performed the Following     OTOLARYNGOLOGY REFERRAL        Primary Care Provider Office Phone # Fax #    Alexis Garrison [phone] [address] [place] [address]        Equal Access to Services     [facility]: Hadii aad ku hadasho Soomaali, waaxda luqadaha, qaybta kaalmada aderashmiyada, megan raman . So [facility] [phone].    ATENCIÓN: Si habla español, tiene a rivera disposición servicios gratuitos de asistencia lingüística. Llame al [phone].    We comply with applicable federal civil rights laws and [place] laws. We do not discriminate on the basis of race, color, national origin, age, disability, sex, sexual orientation, or gender identity.            Thank you!     Thank you for choosing [facility] [place]  for your care. Our goal is always to provide you with excellent care. Hearing back from our patients is one way we can " continue to improve our services. Please take a few minutes to complete the written survey that you may receive in the mail after your visit with us. Thank you!             Your Updated Medication List - Protect others around you: Learn how to safely use, store and throw away your medicines at www.disposemymeds.org.          This list is accurate as of 9/20/18  8:08 AM.  Always use your most recent med list.                   Brand Name Dispense Instructions for use Diagnosis    fluticasone 50 MCG/ACT spray    FLONASE    1 Bottle    Spray 2 sprays into both nostrils daily    Middle ear effusion, bilateral

## 2019-03-11 PROBLEM — M54.50 LUMBAGO: Status: RESOLVED | Noted: 2018-07-03 | Resolved: 2019-03-11

## 2019-12-20 NOTE — PATIENT INSTRUCTIONS
Anesthesia Pre-Procedure Evaluation    Patient: Eliza Thurman   MRN: [id number] : 1962          Preoperative Diagnosis: * No pre-op diagnosis entered *    * No procedures listed *    No past medical history on file.  No past surgical history on file.  Anesthesia Evaluation     .             ROS/MED HX    ENT/Pulmonary:       Neurologic:       Cardiovascular:         METS/Exercise Tolerance:     Hematologic:     (+) Anemia, -      Musculoskeletal:         GI/Hepatic:     (+) Other GI/Hepatic       Renal/Genitourinary:         Endo:         Psychiatric:         Infectious Disease:         Malignancy:         Other:                                 Lab Results   Component Value Date    WBC 9.0 2019    HGB 5.0 (LL) 2019    HCT 16.8 (L) 2019     2019     2019    POTASSIUM 5.3 2019    CHLORIDE 116 (H) 2019    CO2 13 (L) 2019     (H) 2019    CR 3.27 (H) 2019     (H) 2019    MORGAN 7.3 (L) 2019    ALBUMIN 2.0 (L) 2019    PROTTOTAL 5.2 (L) 2019    ALT 24 2019    AST 33 2019    ALKPHOS 81 2019    BILITOTAL 0.2 2019    LIPASE 209 2019    INR 1.34 (H) 2019       Preop Vitals  BP Readings from Last 3 Encounters:   19 (!) 62/47    Pulse Readings from Last 3 Encounters:   19 120      Resp Readings from Last 3 Encounters:   19 18    SpO2 Readings from Last 3 Encounters:   19 98%      Temp Readings from Last 1 Encounters:   19 96.2  F (35.7  C) (Axillary)    Ht Readings from Last 1 Encounters:   No data found for Ht      Wt Readings from Last 1 Encounters:   19 41.5 kg (91 lb 7.9 oz)    There is no height or weight on file to calculate BMI.       Anesthesia Plan      History & Physical Review      ASA Status:  4 .    NPO Status:  Full stomach    Plan for General with Intravenous induction.          Postoperative Care      Consents            Follow up for complete physcial exam within 6 months.    I'll await the attending Physician Statement.    Alexis Garrison MD  Internal Medicine and Pediatrics          Julieta Rodriguez, ELLIOTT CRNA                    .

## 2022-07-27 NOTE — LETTER
[facility]  [address] [place] [address]  [place] [address]  [phone]      July 19, 2018    Cody GREEN Willging                                                                                                                                                       [address]   [place] [address]              To whom it may concern,      Cody is under our care for a medical condition and will be unable to work 07/19.                          Sincerely,  Alexis Garrison MD     children/significant other

## 2023-01-20 NOTE — TELEPHONE ENCOUNTER
He lost the last note. Reprinted it. He is wondering if the note  for the appointment today could include yesterday as well and clear him to return to work tomorrow?   Etelvina Land RN    
I saw him on Aug 3 and note to be excused from 7/30-8/3 due to migraine.     He was to return on Aug 6. I did not hear anything from him until today.     I cannot excuse him from 8/6-8/13.    Jazmín Hamilton PA-C      
Note brought to .  Jeffrey Posey CMA    
Patient calling back that his employer says he needs further information in his note to be able to return to work. It needs to say that Cody was initially treated on Aug 3 and treated again Aug 14 and could you please excuse him from July 30 through Aug 14 and he is able to return to work Aug 15 with no restrictions. He would like it to be placed at the  to  later today. [phone]  Letter pended to be printed and signed if appropriate.   Etelvina Land RN    
chest pain a/w sob, dizziness, nausea